# Patient Record
Sex: MALE | Race: AMERICAN INDIAN OR ALASKA NATIVE
[De-identification: names, ages, dates, MRNs, and addresses within clinical notes are randomized per-mention and may not be internally consistent; named-entity substitution may affect disease eponyms.]

---

## 2021-07-20 ENCOUNTER — HOSPITAL ENCOUNTER (EMERGENCY)
Dept: HOSPITAL 5 - ED | Age: 66
Discharge: HOME | End: 2021-07-20
Payer: MEDICARE

## 2021-07-20 VITALS — DIASTOLIC BLOOD PRESSURE: 59 MMHG | SYSTOLIC BLOOD PRESSURE: 117 MMHG

## 2021-07-20 DIAGNOSIS — Z87.891: ICD-10-CM

## 2021-07-20 DIAGNOSIS — I10: ICD-10-CM

## 2021-07-20 DIAGNOSIS — E11.9: ICD-10-CM

## 2021-07-20 DIAGNOSIS — F12.10: ICD-10-CM

## 2021-07-20 DIAGNOSIS — F03.90: ICD-10-CM

## 2021-07-20 DIAGNOSIS — Z79.899: ICD-10-CM

## 2021-07-20 DIAGNOSIS — R42: Primary | ICD-10-CM

## 2021-07-20 LAB
ALBUMIN SERPL-MCNC: 4.5 G/DL (ref 3.9–5)
ALT SERPL-CCNC: 24 UNITS/L (ref 7–56)
BASOPHILS # (AUTO): 0 K/MM3 (ref 0–0.1)
BASOPHILS NFR BLD AUTO: 0.9 % (ref 0–1.8)
BUN SERPL-MCNC: 6 MG/DL (ref 9–20)
BUN/CREAT SERPL: 8 %
CALCIUM SERPL-MCNC: 10.5 MG/DL (ref 8.4–10.2)
EOSINOPHIL # BLD AUTO: 0.1 K/MM3 (ref 0–0.4)
EOSINOPHIL NFR BLD AUTO: 2.2 % (ref 0–4.3)
HCT VFR BLD CALC: 41.9 % (ref 35.5–45.6)
HEMOLYSIS INDEX: 54
HGB BLD-MCNC: 14 GM/DL (ref 11.8–15.2)
LYMPHOCYTES # BLD AUTO: 1.3 K/MM3 (ref 1.2–5.4)
LYMPHOCYTES NFR BLD AUTO: 28.9 % (ref 13.4–35)
MCHC RBC AUTO-ENTMCNC: 34 % (ref 32–34)
MCV RBC AUTO: 88 FL (ref 84–94)
MONOCYTES # (AUTO): 0.6 K/MM3 (ref 0–0.8)
MONOCYTES % (AUTO): 13.4 % (ref 0–7.3)
PLATELET # BLD: 235 K/MM3 (ref 140–440)
RBC # BLD AUTO: 4.78 M/MM3 (ref 3.65–5.03)

## 2021-07-20 PROCEDURE — 84484 ASSAY OF TROPONIN QUANT: CPT

## 2021-07-20 PROCEDURE — 80053 COMPREHEN METABOLIC PANEL: CPT

## 2021-07-20 PROCEDURE — 85025 COMPLETE CBC W/AUTO DIFF WBC: CPT

## 2021-07-20 PROCEDURE — 82550 ASSAY OF CK (CPK): CPT

## 2021-07-20 PROCEDURE — 71045 X-RAY EXAM CHEST 1 VIEW: CPT

## 2021-07-20 PROCEDURE — 99285 EMERGENCY DEPT VISIT HI MDM: CPT

## 2021-07-20 PROCEDURE — 70450 CT HEAD/BRAIN W/O DYE: CPT

## 2021-07-20 PROCEDURE — 93005 ELECTROCARDIOGRAM TRACING: CPT

## 2021-07-20 PROCEDURE — 36415 COLL VENOUS BLD VENIPUNCTURE: CPT

## 2021-07-20 PROCEDURE — 83880 ASSAY OF NATRIURETIC PEPTIDE: CPT

## 2021-07-20 PROCEDURE — 71275 CT ANGIOGRAPHY CHEST: CPT

## 2021-07-20 PROCEDURE — 82962 GLUCOSE BLOOD TEST: CPT

## 2021-07-20 PROCEDURE — 82553 CREATINE MB FRACTION: CPT

## 2021-07-20 PROCEDURE — 85379 FIBRIN DEGRADATION QUANT: CPT

## 2021-07-20 NOTE — CAT SCAN REPORT
CT head/brain wo con



INDICATION:

Dizziness.



TECHNIQUE: Routine CT head. All CT scans at this location are performed using CT dose reduction for A
JOYCE by means of automated exposure control.



COMPARISON: 

None.



FINDINGS:



Intracranial: Gray-white matter differentiation is maintained. No intracranial hemorrhage. No extra a
xial collection. No hydrocephalus. No herniation.

Sinuses: Paranasal sinuses and mastoid air cells are essentially clear.

Orbits: Globes are intact. 

Calvarium: No acute fracture.





IMPRESSION:

1.  No acute intracranial abnormality.



Signer Name: Parish Tilley MD 

Signed: 7/20/2021 8:25 PM

Workstation Name: [id number]

## 2021-07-20 NOTE — XRAY REPORT
CHEST 1 VIEW 7/20/2021 11:20 AM



INDICATION / CLINICAL INFORMATION: Shortness of breath.



COMPARISON: None available.



FINDINGS:



SUPPORT DEVICES: None.



HEART / MEDIASTINUM: No significant abnormality. 



LUNGS / PLEURA: No significant pulmonary or pleural abnormality. No pneumothorax. 



ADDITIONAL FINDINGS: No significant additional findings.



IMPRESSION:

1. No acute findings.



Signer Name: Rodney Franks MD 

Signed: 7/20/2021 11:21 AM

Workstation Name: [email or web address]

## 2021-07-20 NOTE — EMERGENCY DEPARTMENT REPORT
HPI





- General


Time Seen by Provider: 21 10:50





- HPI


HPI: 





Room 25








The patient is a 66-year-old male present with a chief complaint of shortness of

breath.  The patient states prior to arrival he had fallen asleep in his rocking

chair and he awakened feeling short of breath gasping for air.  Patient denied 

chest pain or pain of any type.  Patient states he tried to stand up but felt 

dizzy prompting him to call 911.  Patient was transported to the ED and the 

patient currently denies shortness of breath.  Patient has a history of dementia

and is unable to tell me how long he was symptomatic.  I asked the patient if 

his symptoms resolved before he arrived to the emergency department and the 

patient replies "I do not know."





ED Past Medical Hx





- Past Medical History


Hx Hypertension: Yes


Hx Diabetes: Yes


Hx Dementia: Yes





- Surgical History


Additional Surgical History: Large ex lap wound present but patient is unaware 

of what surgery was performed and for what reason





- Family History


Family history: no significant





- Social History


Smoking Status: Former Smoker (None x15 years)


Substance Use Type: Marijuana





- Medications


Home Medications: 


                                Home Medications











 Medication  Instructions  Recorded  Confirmed  Last Taken  Type


 


Meclizine [Antivert] 25 mg PO TID PRN #20 tablet 21  Unknown Rx














ED Review of Systems


ROS: 


Stated complaint: SOB/DIZZINESS


Other details as noted in HPI





Constitutional: no symptoms reported


Eyes: denies: eye pain


ENT: denies: throat pain


Respiratory: shortness of breath


Cardiovascular: denies: chest pain


Endocrine: no symptoms reported


Gastrointestinal: denies: abdominal pain


Genitourinary: denies: dysuria


Musculoskeletal: denies: back pain


Neurological: denies: headache





Physical Exam





- Physical Exam


Physical Exam: 





GENERAL: The patient is well-developed well-nourished male lying on stretcher 

not appearing to be in acute distress. []


HEENT: Normocephalic.  Atraumatic.  Extraocular motions are intact.  Patient has

 moist mucous membranes.


NECK: Supple.  Trachea midline


CHEST/LUNGS: Clear to auscultation.  There is no respiratory distress noted.


HEART/CARDIOVASCULAR: Regular.  There is no tachycardia.  There is no gallop rub

 or murmur.


ABDOMEN: Abdomen is soft, nontender.  Patient has normal bowel sounds.  There is

 no abdominal distention.


SKIN: There is no rash.  There is no edema.  There is no diaphoresis.


NEURO: The patient is awake, alert, and oriented.  The patient is cooperative.  

The patient has no focal neurologic deficits.  The patient has normal speech.  

Cranial nerves II through XII grossly intact.  GCS 15


MUSCULOSKELETAL:  There is no evidence of acute injury.





ED Medical Decision Making





- Lab Data


Result diagrams: 


                                 21 11:11





                                 21 11:11





                                Laboratory Tests











  21





  11:03 11:11 11:11


 


WBC   4.5 


 


RBC   4.78 


 


Hgb   14.0 


 


Hct   41.9 


 


MCV   88 


 


MCH   29 


 


MCHC   34 


 


RDW   14.2 


 


Plt Count   235 


 


Lymph % (Auto)   28.9 


 


Mono % (Auto)   13.4 H 


 


Eos % (Auto)   2.2 


 


Baso % (Auto)   0.9 


 


Lymph # (Auto)   1.3 


 


Mono # (Auto)   0.6 


 


Eos # (Auto)   0.1 


 


Baso # (Auto)   0.0 


 


Seg Neutrophils %   54.6 


 


Seg Neutrophils #   2.4 


 


D-Dimer    247.74 H


 


Sodium   


 


Potassium   


 


Chloride   


 


Carbon Dioxide   


 


Anion Gap   


 


BUN   


 


Creatinine   


 


Estimated GFR   


 


BUN/Creatinine Ratio   


 


Glucose   


 


POC Glucose  138 H  


 


Calcium   


 


Total Bilirubin   


 


AST   


 


ALT   


 


Alkaline Phosphatase   


 


Total Creatine Kinase   


 


CK-MB (CK-2)   


 


CK-MB (CK-2) Rel Index   


 


Troponin T   


 


NT-Pro-B Natriuret Pep   


 


Total Protein   


 


Albumin   


 


Albumin/Globulin Ratio   














  21





  11:11 15:14 15:27


 


WBC   


 


RBC   


 


Hgb   


 


Hct   


 


MCV   


 


MCH   


 


MCHC   


 


RDW   


 


Plt Count   


 


Lymph % (Auto)   


 


Mono % (Auto)   


 


Eos % (Auto)   


 


Baso % (Auto)   


 


Lymph # (Auto)   


 


Mono # (Auto)   


 


Eos # (Auto)   


 


Baso # (Auto)   


 


Seg Neutrophils %   


 


Seg Neutrophils #   


 


D-Dimer   


 


Sodium  137  


 


Potassium  4.3  


 


Chloride  99.4  


 


Carbon Dioxide  26  


 


Anion Gap  16  


 


BUN  6 L  


 


Creatinine  0.8  


 


Estimated GFR  > 60  


 


BUN/Creatinine Ratio  8  


 


Glucose  129 H  


 


POC Glucose   86 


 


Calcium  10.5 H  


 


Total Bilirubin  0.30  


 


AST  19  


 


ALT  24  


 


Alkaline Phosphatase  84  


 


Total Creatine Kinase  117  


 


CK-MB (CK-2)  < 1.0  


 


CK-MB (CK-2) Rel Index  0.8  


 


Troponin T  < 0.010   < 0.010


 


NT-Pro-B Natriuret Pep  16.00  


 


Total Protein  7.6  


 


Albumin  4.5  


 


Albumin/Globulin Ratio  1.5  














- EKG Data


-: EKG Interpreted by Me


EKG shows normal: sinus rhythm


Rate: normal





- EKG Data


When compared to previous EKG there are: previous EKG unavailable


Interpretation: other (No ischemic changes seen)





- Radiology Data


Radiology results: report reviewed (CT chest), image reviewed (Chest x-ray, CT 

chest)


interpreted by me: 





Chest x-ray-no definite focal infiltrates, no pneumothorax.  No foreign body 

seen





[facility] [address] [id number] Cat

 Scan Report Signed Patient: LANCE DERAS MR#: [id number] : 1955 

Acct:[id number] Age/Sex: 66 / M ADM Date: 21 Loc: ED Attending Dr: 

Ordering Physician: KEARA LOYD MD Date of Service: 21 Procedure(s): CT 

angio chest Accession Number(s): [id number] cc: KEARA LOYD MD CTA CHEST WITH 

CONTRAST INDICATION : Shortness of breath OMNI 350 100ML . TECHNIQUE: Axial 

imaging performed through the chest, with contrast bolus timing set to maximize 

opacification of the pulmonary arteries. Sagittal and coronal reformatted 

images. 3-plane MIP reformatted images were obtained. All CT scans at this 

location are performed using CT dose reduction for ALARA by means of automated 

exposure control. 100 mL of intravenous contrast administered. COMPARISON: None 

FINDINGS: Bolus: Contrast bolus timing is adequate. PTE: No filling defect is 

present to suggest PTE. Mediastinum: Heart and great vessels appear normal. No 

pathologic mediastinal adenopathy. Lungs: The lungs are clear with no evidence 

for infiltrate, pleural fluid or pneumothorax. Minimal emphysematous changes are

 noted in the upper lobes. Bones: Degenerative changes in the spine with nothing

 acute. Upper abdomen: Limited imaging of the upper abdomen shows nothing acute.

 IMPRESSION: Negative for PTE. Clear lungs. Minimal emphysematous changes. 

Signer Name: Axel Romero Jr, MD Signed: 2021 2:55 PM Workstation Name:

 [id number] Transcribed By: TTR Dictated By: AXEL ROMERO JR, MD 

Electronically Authenticated By: AXEL ROMERO JR, MD Signed Date/Time: 

21 0375 DD/DT: 21 1446 TD/TT: 


Print Cancel 











- Differential Diagnosis


Shortness of breath, ACS, bronchitis, PE, dysrhythmia


Critical care attestation.: 


If time is entered above; I have spent that time in minutes in the direct care 

of this critically ill patient, excluding procedure time.








ED Disposition


Clinical Impression: 


 Vertigo, Dementia





Disposition: - TO HOME OR SELFCARE


Is pt being admited?: No


Does the pt Need Aspirin: No


Condition: Stable


Instructions:  Dizziness, Easy-to-Read


Additional Instructions: 


Return to the emergency department should you develop worsening symptoms, 

inability to tolerate food or liquids, high fever or any other concerns


Prescriptions: 


Meclizine [Antivert] 25 mg PO TID PRN #20 tablet


 PRN Reason: Vertigo


Referrals: 


OSMANI GOMES MD [Primary Care Provider] - 3-5 Days

## 2021-07-20 NOTE — CAT SCAN REPORT
CTA CHEST WITH CONTRAST



INDICATION : Shortness of breath OMNI 350 100ML .



TECHNIQUE:  Axial imaging performed through the chest, with contrast bolus timing set to maximize opa
cification of the pulmonary arteries. Sagittal and coronal reformatted images. 3-plane MIP reformatte
d images were obtained.  All CT scans at this location are performed using CT dose reduction for ALAR
A by means of automated exposure control. 



100 mL of intravenous contrast administered.



COMPARISON:  None



FINDINGS:  



Bolus:  Contrast bolus timing is adequate.

PTE:  No filling defect is present to suggest PTE.

Mediastinum:  Heart and great vessels appear normal.  No pathologic mediastinal adenopathy.

Lungs:  The lungs are clear with no evidence for infiltrate, pleural fluid or pneumothorax. Minimal e
mphysematous changes are noted in the upper lobes.

Bones:  Degenerative changes in the spine with nothing acute.



Upper abdomen:  Limited imaging of the upper abdomen shows nothing acute.





IMPRESSION:

 Negative for PTE.  Clear lungs. Minimal emphysematous changes.



Signer Name: Axel Romero Jr, MD 

Signed: 7/20/2021 2:55 PM

Workstation Name: [id number]

## 2021-07-22 NOTE — ELECTROCARDIOGRAPH REPORT
[facility]

                                       

Test Date:    2021               Test Time:    11:26:07

Pat Name:     LANCE DERAS            Department:   

Patient ID:   [id number]          Room:          

Gender:       M                        Technician:   NURSE

:          1955               Requested By: KEARA LOYD

Order Number: [id number]              Reading MD:   Armand Mackey

                                 Measurements

Intervals                              Axis          

Rate:         79                       P:            32

NC:           159                      QRS:          -26

QRSD:         89                       T:            18

QT:           383                                    

QTc:          438                                    

                           Interpretive Statements

Sinus rhythm

Ventricular premature complex

Probable left atrial enlargement

Left axis deviation

Poor R wave progression, possible old anteroseptal infarct

No previous ECG available for comparison

Electronically Signed On 2021 12:12:10 EDT by Armand Mackey

## 2021-10-30 ENCOUNTER — HOSPITAL ENCOUNTER (EMERGENCY)
Dept: HOSPITAL 5 - ED | Age: 66
Discharge: HOME | End: 2021-10-30
Payer: MEDICARE

## 2021-10-30 VITALS — SYSTOLIC BLOOD PRESSURE: 149 MMHG | DIASTOLIC BLOOD PRESSURE: 82 MMHG

## 2021-10-30 DIAGNOSIS — Z79.899: ICD-10-CM

## 2021-10-30 DIAGNOSIS — F12.90: ICD-10-CM

## 2021-10-30 DIAGNOSIS — I10: ICD-10-CM

## 2021-10-30 DIAGNOSIS — Z87.891: ICD-10-CM

## 2021-10-30 DIAGNOSIS — Z79.84: ICD-10-CM

## 2021-10-30 DIAGNOSIS — E11.649: ICD-10-CM

## 2021-10-30 DIAGNOSIS — R79.1: ICD-10-CM

## 2021-10-30 DIAGNOSIS — F03.90: Primary | ICD-10-CM

## 2021-10-30 LAB
ALBUMIN SERPL-MCNC: 4.3 G/DL (ref 3.9–5)
ALT SERPL-CCNC: 24 UNITS/L (ref 7–56)
BASOPHILS # (AUTO): 0.1 K/MM3 (ref 0–0.1)
BASOPHILS NFR BLD AUTO: 2.2 % (ref 0–1.8)
BILIRUB UR QL STRIP: (no result)
BLOOD UR QL VISUAL: (no result)
BUN SERPL-MCNC: 8 MG/DL (ref 9–20)
BUN/CREAT SERPL: 10 %
CALCIUM SERPL-MCNC: 10 MG/DL (ref 8.4–10.2)
EOSINOPHIL # BLD AUTO: 0.1 K/MM3 (ref 0–0.4)
EOSINOPHIL NFR BLD AUTO: 3 % (ref 0–4.3)
HCT VFR BLD CALC: 43 % (ref 35.5–45.6)
HEMOLYSIS INDEX: 9
HGB BLD-MCNC: 14.3 GM/DL (ref 11.8–15.2)
INR PPP: 0.96 (ref 0.87–1.13)
LYMPHOCYTES # BLD AUTO: 1.3 K/MM3 (ref 1.2–5.4)
LYMPHOCYTES NFR BLD AUTO: 28.7 % (ref 13.4–35)
MCHC RBC AUTO-ENTMCNC: 33 % (ref 32–34)
MCV RBC AUTO: 87 FL (ref 84–94)
MONOCYTES # (AUTO): 0.5 K/MM3 (ref 0–0.8)
MONOCYTES % (AUTO): 11 % (ref 0–7.3)
PH UR STRIP: 8 [PH] (ref 5–7)
PLATELET # BLD: 285 K/MM3 (ref 140–440)
PROT UR STRIP-MCNC: (no result) MG/DL
RBC # BLD AUTO: 4.94 M/MM3 (ref 3.65–5.03)
RBC #/AREA URNS HPF: 1 /HPF (ref 0–6)
UROBILINOGEN UR-MCNC: < 2 MG/DL (ref ?–2)
WBC #/AREA URNS HPF: < 1 /HPF (ref 0–6)

## 2021-10-30 PROCEDURE — 80053 COMPREHEN METABOLIC PANEL: CPT

## 2021-10-30 PROCEDURE — 93005 ELECTROCARDIOGRAM TRACING: CPT

## 2021-10-30 PROCEDURE — G0480 DRUG TEST DEF 1-7 CLASSES: HCPCS

## 2021-10-30 PROCEDURE — 99285 EMERGENCY DEPT VISIT HI MDM: CPT

## 2021-10-30 PROCEDURE — 83735 ASSAY OF MAGNESIUM: CPT

## 2021-10-30 PROCEDURE — 82550 ASSAY OF CK (CPK): CPT

## 2021-10-30 PROCEDURE — 82140 ASSAY OF AMMONIA: CPT

## 2021-10-30 PROCEDURE — 82962 GLUCOSE BLOOD TEST: CPT

## 2021-10-30 PROCEDURE — 85610 PROTHROMBIN TIME: CPT

## 2021-10-30 PROCEDURE — 70450 CT HEAD/BRAIN W/O DYE: CPT

## 2021-10-30 PROCEDURE — 85025 COMPLETE CBC W/AUTO DIFF WBC: CPT

## 2021-10-30 PROCEDURE — 81001 URINALYSIS AUTO W/SCOPE: CPT

## 2021-10-30 PROCEDURE — 71045 X-RAY EXAM CHEST 1 VIEW: CPT

## 2021-10-30 PROCEDURE — 84484 ASSAY OF TROPONIN QUANT: CPT

## 2021-10-30 PROCEDURE — 80320 DRUG SCREEN QUANTALCOHOLS: CPT

## 2021-10-30 PROCEDURE — 36415 COLL VENOUS BLD VENIPUNCTURE: CPT

## 2021-10-30 PROCEDURE — 84443 ASSAY THYROID STIM HORMONE: CPT

## 2021-10-30 NOTE — XRAY REPORT
CHEST 1 VIEW 10/30/2021 10:50 AM



INDICATION / CLINICAL INFORMATION: Weakness.



COMPARISON: 7/20/2021



FINDINGS:



SUPPORT DEVICES: None.

HEART / MEDIASTINUM: No significant abnormality. 

LUNGS / PLEURA: No significant pulmonary or pleural abnormality. No pneumothorax. 



ADDITIONAL FINDINGS: No significant additional findings.



IMPRESSION:

1. No acute findings.



Signer Name: Ji Koenig MD 

Signed: 10/30/2021 11:16 AM

Workstation Name: [id number]

## 2021-10-30 NOTE — EMERGENCY DEPARTMENT REPORT
ED General Adult HPI





- General


Chief complaint: Weakness


Stated complaint: SLUTTERING/WEAKNESS


PUI?: No


Time Seen by Provider: 10/30/21 10:40


Source: patient, family, EMS (Verbal report received from emergency medical ser

vices.  EMS documentation not available at time of chart dictation ), RN notes 

reviewed, old records reviewed


Mode of arrival: Stretcher


Limitations: Other (Patient is demented and a poor historian)





- History of Present Illness


Initial comments: 





The patient is a 66-year-old gentleman.  The patient has a history of dementia 

and diabetes.  He follows with the [facility] medical group.  Most of the history 

is obtained from EMS, and by speaking to his wife, Ms. Trudi Monzon.





Apparently, the patient was confused this morning, and stuttering.  He was also 

found to have evidence of hypoglycemia.





As per his wife, he is currently seeing an outpatient neurologist, it was 

recently started on Seroquel/quetiapine for stuttering, dementia, and 

depression.





Wife denies headache, chest pain, shortness of breath, nausea, vomiting, 

diarrhea, fevers and chills and urinary symptoms.





In the emergency room, his wife corroborates that the patient appears to be at 

his demented baseline.





The patient himself is pleasantly confused.  He denies physical pain.  The 

patient is not able to describe qualitative nature of symptoms, exacerbating 

factors, relieving factors or aggravating factors.  EMS reported Accu-Chek was 

low in the field.  In the emergency room, Accu-Chek is within acceptable limits.


-: This morning


Consistency: now resolved


Improves with: medication


Associated Symptoms: denies other symptoms





- Related Data


                                Home Medications











 Medication  Instructions  Recorded  Confirmed  Last Taken


 


AtorvaSTATin 40 mg PO HS 10/30/21 10/30/21 Unknown


 


Lisinopril 20 mg PO DAILY 10/30/21 10/30/21 Unknown


 


QUEtiapine 25 mg PO HS 10/30/21 10/30/21 Unknown


 


amLODIPine 10 mg PO DAILY 10/30/21 10/30/21 Unknown


 


metFORMIN 500 mg PO BID 10/30/21 10/30/21 Unknown











                                    Allergies











Allergy/AdvReac Type Severity Reaction Status Date / Time


 


No Known Allergies Allergy   Unverified 07/20/21 12:44














ED Review of Systems


ROS: 


Stated complaint: SLUTTERING/WEAKNESS


Other details as noted in HPI





Comment: Per wife


Constitutional: denies: fever


Eyes: denies: eye discharge


ENT: denies: epistaxis


Respiratory: denies: cough


Cardiovascular: denies: chest pain


Gastrointestinal: denies: abdominal pain


Genitourinary: denies: dysuria, frequency


Neurological: confusion (Baseline)





ED Past Medical Hx





- Past Medical History


Previous Medical History?: Yes


Hx Hypertension: Yes


Hx Diabetes: Yes


Hx Dementia: Yes


Additional medical history: dementia, vertigo





- Surgical History


Past Surgical History?: Yes


Additional Surgical History: Large ex lap wound present but patient is unaware 

of what surgery was performed and for what reason





- Social History


Smoking Status: Former Smoker (None x15 years)


Substance Use Type: Marijuana





- Medications


Home Medications: 


                                Home Medications











 Medication  Instructions  Recorded  Confirmed  Last Taken  Type


 


AtorvaSTATin 40 mg PO HS 10/30/21 10/30/21 Unknown History


 


Lisinopril 20 mg PO DAILY 10/30/21 10/30/21 Unknown History


 


QUEtiapine 25 mg PO HS 10/30/21 10/30/21 Unknown History


 


amLODIPine 10 mg PO DAILY 10/30/21 10/30/21 Unknown History


 


metFORMIN 500 mg PO BID 10/30/21 10/30/21 Unknown History














ED Physical Exam





- General


Limitations: Other (Demented and confused)


General appearance: alert, in no apparent distress





- Head


Head exam: Present: atraumatic, normocephalic





- Eye


Eye exam: Present: normal appearance, EOMI.  Absent: nystagmus





- ENT


ENT exam: Present: normal exam, normal orophraynx, mucous membranes moist, 

normal external ear exam





- Neck


Neck exam: Present: normal inspection, full ROM.  Absent: tenderness, 

meningismus





- Respiratory


Respiratory exam: Present: normal lung sounds bilaterally.  Absent: respiratory 

distress, wheezes, rales, rhonchi, stridor, decreased breath sounds





- Cardiovascular


Cardiovascular Exam: Present: regular rate, normal rhythm, normal heart sounds. 

Absent: bradycardia, tachycardia, irregular rhythm, systolic murmur, diastolic 

murmur, rubs, gallop





- GI/Abdominal


GI/Abdominal exam: Present: soft.  Absent: distended, tenderness, guarding, 

rebound, rigid, pulsatile mass





- Rectal


Rectal exam: Present: deferred





- 


 exam: Present: normal inspection


External exam: Present: normal external exam, other (Chaperoned by nurse Franky Rodriguez)





- Extremities Exam


Extremities exam: Present: normal inspection, full ROM, other (2+ pulses noted 

in the bilateral upper and lower extremities.  There is no palpable cord.   

negative Homans sign.  Muscular compartments are soft.  The pelvis is stable.). 

Absent: pedal edema, calf tenderness





- Back Exam


Back exam: Present: normal inspection, full ROM.  Absent: tenderness, CVA 

tenderness (R), CVA tenderness (L), muscle spasm, vertebral tenderness, rash 

noted





- Neurological Exam


Neurological exam: Present: altered (Patient is alert to name.  Does not know 

the month.  Follows commands.  Knows that he is at a hospital.), normal gait, 

other (2+ pulses noted in the bilateral upper and lower extremities.  There is 

no palpable cord.   negative Homans sign.  Muscular compartments are soft.  The 

pelvis is stable.).  Absent: motor sensory deficit





- Psychiatric


Psychiatric exam: Present: anxious





- Skin


Skin exam: Present: warm, dry, intact, normal color.  Absent: rash





ED Course


                                   Vital Signs











  10/30/21 10/30/21





  10:59 11:28


 


Temperature 98.8 F 98.9 F


 


Pulse Rate 86 


 


Respiratory 16 





Rate  


 


Blood Pressure 149/82 





[Left]  


 


O2 Sat by Pulse 98 





Oximetry  














ED Medical Decision Making





- Lab Data


Result diagrams: 


                                 10/30/21 11:32





                                 10/30/21 11:32








                                   Vital Signs











  10/30/21 10/30/21





  10:59 11:28


 


Temperature 98.8 F 98.9 F


 


Pulse Rate 86 


 


Respiratory 16 





Rate  


 


Blood Pressure 149/82 





[Left]  


 


O2 Sat by Pulse 98 





Oximetry  











                                   Lab Results











  10/30/21 10/30/21 10/30/21 Range/Units





  11:22 11:26 11:32 


 


WBC     (4.5-11.0)  K/mm3


 


RBC     (3.65-5.03)  M/mm3


 


Hgb     (11.8-15.2)  gm/dl


 


Hct     (35.5-45.6)  %


 


MCV     (84-94)  fl


 


MCH     (28-32)  pg


 


MCHC     (32-34)  %


 


RDW     (13.2-15.2)  %


 


Plt Count     (140-440)  K/mm3


 


Lymph % (Auto)     (13.4-35.0)  %


 


Mono % (Auto)     (0.0-7.3)  %


 


Eos % (Auto)     (0.0-4.3)  %


 


Baso % (Auto)     (0.0-1.8)  %


 


Lymph # (Auto)     (1.2-5.4)  K/mm3


 


Mono # (Auto)     (0.0-0.8)  K/mm3


 


Eos # (Auto)     (0.0-0.4)  K/mm3


 


Baso # (Auto)     (0.0-0.1)  K/mm3


 


Seg Neutrophils %     (40.0-70.0)  %


 


Seg Neutrophils #     (1.8-7.7)  K/mm3


 


PT     (12.2-14.9)  Sec.


 


INR     (0.87-1.13)  


 


Sodium    136 L  (137-145)  mmol/L


 


Potassium    4.2  (3.6-5.0)  mmol/L


 


Chloride    100.4  ()  mmol/L


 


Carbon Dioxide    24  (22-30)  mmol/L


 


Anion Gap    16  mmol/L


 


BUN    8 L  (9-20)  mg/dL


 


Creatinine    0.8  (0.8-1.3)  mg/dL


 


Estimated GFR    > 60  ml/min


 


BUN/Creatinine Ratio    10  %


 


Glucose    170 H  ()  mg/dL


 


POC Glucose   157 H   ()  mg/dL


 


Lactic Acid     (0.7-2.0)  mmol/L


 


Calcium    10.0  (8.4-10.2)  mg/dL


 


Magnesium     (1.7-2.3)  mg/dL


 


Total Bilirubin    0.30  (0.1-1.2)  mg/dL


 


AST    13  (5-40)  units/L


 


ALT    24  (7-56)  units/L


 


Alkaline Phosphatase    92  ()  units/L


 


Total Creatine Kinase     ()  units/L


 


Troponin T     (0.00-0.029)  ng/mL


 


Total Protein    7.9  (6.3-8.2)  g/dL


 


Albumin    4.3  (3.9-5)  g/dL


 


Albumin/Globulin Ratio    1.2  %


 


TSH     (0.270-4.200)  mlU/mL


 


Urine Color  Straw    (Yellow)  


 


Urine Turbidity  Clear    (Clear)  


 


Urine pH  8.0 H    (5.0-7.0)  


 


Ur Specific Gravity  1.006    (1.003-1.030)  


 


Urine Protein  <15 mg/dl    (Negative)  mg/dL


 


Urine Glucose (UA)  Neg    (Negative)  mg/dL


 


Urine Ketones  Neg    (Negative)  mg/dL


 


Urine Blood  Neg    (Negative)  


 


Urine Nitrite  Neg    (Negative)  


 


Urine Bilirubin  Neg    (Negative)  


 


Urine Urobilinogen  < 2.0    (<2.0)  mg/dL


 


Ur Leukocyte Esterase  Neg    (Negative)  


 


Urine WBC (Auto)  < 1.0    (0.0-6.0)  /HPF


 


Urine RBC (Auto)  1.0    (0.0-6.0)  /HPF


 


Salicylates     (2.8-20.0)  mg/dL


 


Acetaminophen     (10.0-30.0)  ug/mL














  10/30/21 10/30/21 10/30/21 Range/Units





  11:32 11:32 11:32 


 


WBC  4.4 L    (4.5-11.0)  K/mm3


 


RBC  4.94    (3.65-5.03)  M/mm3


 


Hgb  14.3    (11.8-15.2)  gm/dl


 


Hct  43.0    (35.5-45.6)  %


 


MCV  87    (84-94)  fl


 


MCH  29    (28-32)  pg


 


MCHC  33    (32-34)  %


 


RDW  13.6    (13.2-15.2)  %


 


Plt Count  285    (140-440)  K/mm3


 


Lymph % (Auto)  28.7    (13.4-35.0)  %


 


Mono % (Auto)  11.0 H    (0.0-7.3)  %


 


Eos % (Auto)  3.0    (0.0-4.3)  %


 


Baso % (Auto)  2.2 H    (0.0-1.8)  %


 


Lymph # (Auto)  1.3    (1.2-5.4)  K/mm3


 


Mono # (Auto)  0.5    (0.0-0.8)  K/mm3


 


Eos # (Auto)  0.1    (0.0-0.4)  K/mm3


 


Baso # (Auto)  0.1    (0.0-0.1)  K/mm3


 


Seg Neutrophils %  55.1    (40.0-70.0)  %


 


Seg Neutrophils #  2.4    (1.8-7.7)  K/mm3


 


PT   13.8   (12.2-14.9)  Sec.


 


INR   0.96   (0.87-1.13)  


 


Sodium     (137-145)  mmol/L


 


Potassium     (3.6-5.0)  mmol/L


 


Chloride     ()  mmol/L


 


Carbon Dioxide     (22-30)  mmol/L


 


Anion Gap     mmol/L


 


BUN     (9-20)  mg/dL


 


Creatinine     (0.8-1.3)  mg/dL


 


Estimated GFR     ml/min


 


BUN/Creatinine Ratio     %


 


Glucose     ()  mg/dL


 


POC Glucose     ()  mg/dL


 


Lactic Acid     (0.7-2.0)  mmol/L


 


Calcium     (8.4-10.2)  mg/dL


 


Magnesium    1.90  (1.7-2.3)  mg/dL


 


Total Bilirubin     (0.1-1.2)  mg/dL


 


AST     (5-40)  units/L


 


ALT     (7-56)  units/L


 


Alkaline Phosphatase     ()  units/L


 


Total Creatine Kinase    103  ()  units/L


 


Troponin T    < 0.010  (0.00-0.029)  ng/mL


 


Total Protein     (6.3-8.2)  g/dL


 


Albumin     (3.9-5)  g/dL


 


Albumin/Globulin Ratio     %


 


TSH     (0.270-4.200)  mlU/mL


 


Urine Color     (Yellow)  


 


Urine Turbidity     (Clear)  


 


Urine pH     (5.0-7.0)  


 


Ur Specific Gravity     (1.003-1.030)  


 


Urine Protein     (Negative)  mg/dL


 


Urine Glucose (UA)     (Negative)  mg/dL


 


Urine Ketones     (Negative)  mg/dL


 


Urine Blood     (Negative)  


 


Urine Nitrite     (Negative)  


 


Urine Bilirubin     (Negative)  


 


Urine Urobilinogen     (<2.0)  mg/dL


 


Ur Leukocyte Esterase     (Negative)  


 


Urine WBC (Auto)     (0.0-6.0)  /HPF


 


Urine RBC (Auto)     (0.0-6.0)  /HPF


 


Salicylates     (2.8-20.0)  mg/dL


 


Acetaminophen     (10.0-30.0)  ug/mL














  10/30/21 10/30/21 10/30/21 Range/Units





  11:32 11:32 11:32 


 


WBC     (4.5-11.0)  K/mm3


 


RBC     (3.65-5.03)  M/mm3


 


Hgb     (11.8-15.2)  gm/dl


 


Hct     (35.5-45.6)  %


 


MCV     (84-94)  fl


 


MCH     (28-32)  pg


 


MCHC     (32-34)  %


 


RDW     (13.2-15.2)  %


 


Plt Count     (140-440)  K/mm3


 


Lymph % (Auto)     (13.4-35.0)  %


 


Mono % (Auto)     (0.0-7.3)  %


 


Eos % (Auto)     (0.0-4.3)  %


 


Baso % (Auto)     (0.0-1.8)  %


 


Lymph # (Auto)     (1.2-5.4)  K/mm3


 


Mono # (Auto)     (0.0-0.8)  K/mm3


 


Eos # (Auto)     (0.0-0.4)  K/mm3


 


Baso # (Auto)     (0.0-0.1)  K/mm3


 


Seg Neutrophils %     (40.0-70.0)  %


 


Seg Neutrophils #     (1.8-7.7)  K/mm3


 


PT     (12.2-14.9)  Sec.


 


INR     (0.87-1.13)  


 


Sodium     (137-145)  mmol/L


 


Potassium     (3.6-5.0)  mmol/L


 


Chloride     ()  mmol/L


 


Carbon Dioxide     (22-30)  mmol/L


 


Anion Gap     mmol/L


 


BUN     (9-20)  mg/dL


 


Creatinine     (0.8-1.3)  mg/dL


 


Estimated GFR     ml/min


 


BUN/Creatinine Ratio     %


 


Glucose     ()  mg/dL


 


POC Glucose     ()  mg/dL


 


Lactic Acid     (0.7-2.0)  mmol/L


 


Calcium     (8.4-10.2)  mg/dL


 


Magnesium     (1.7-2.3)  mg/dL


 


Total Bilirubin     (0.1-1.2)  mg/dL


 


AST     (5-40)  units/L


 


ALT     (7-56)  units/L


 


Alkaline Phosphatase     ()  units/L


 


Total Creatine Kinase     ()  units/L


 


Troponin T     (0.00-0.029)  ng/mL


 


Total Protein     (6.3-8.2)  g/dL


 


Albumin     (3.9-5)  g/dL


 


Albumin/Globulin Ratio     %


 


TSH  1.030    (0.270-4.200)  mlU/mL


 


Urine Color     (Yellow)  


 


Urine Turbidity     (Clear)  


 


Urine pH     (5.0-7.0)  


 


Ur Specific Gravity     (1.003-1.030)  


 


Urine Protein     (Negative)  mg/dL


 


Urine Glucose (UA)     (Negative)  mg/dL


 


Urine Ketones     (Negative)  mg/dL


 


Urine Blood     (Negative)  


 


Urine Nitrite     (Negative)  


 


Urine Bilirubin     (Negative)  


 


Urine Urobilinogen     (<2.0)  mg/dL


 


Ur Leukocyte Esterase     (Negative)  


 


Urine WBC (Auto)     (0.0-6.0)  /HPF


 


Urine RBC (Auto)     (0.0-6.0)  /HPF


 


Salicylates   < 0.3 L   (2.8-20.0)  mg/dL


 


Acetaminophen    5.0 L  (10.0-30.0)  ug/mL














  10/30/21 Range/Units





  11:32 


 


WBC   (4.5-11.0)  K/mm3


 


RBC   (3.65-5.03)  M/mm3


 


Hgb   (11.8-15.2)  gm/dl


 


Hct   (35.5-45.6)  %


 


MCV   (84-94)  fl


 


MCH   (28-32)  pg


 


MCHC   (32-34)  %


 


RDW   (13.2-15.2)  %


 


Plt Count   (140-440)  K/mm3


 


Lymph % (Auto)   (13.4-35.0)  %


 


Mono % (Auto)   (0.0-7.3)  %


 


Eos % (Auto)   (0.0-4.3)  %


 


Baso % (Auto)   (0.0-1.8)  %


 


Lymph # (Auto)   (1.2-5.4)  K/mm3


 


Mono # (Auto)   (0.0-0.8)  K/mm3


 


Eos # (Auto)   (0.0-0.4)  K/mm3


 


Baso # (Auto)   (0.0-0.1)  K/mm3


 


Seg Neutrophils %   (40.0-70.0)  %


 


Seg Neutrophils #   (1.8-7.7)  K/mm3


 


PT   (12.2-14.9)  Sec.


 


INR   (0.87-1.13)  


 


Sodium   (137-145)  mmol/L


 


Potassium   (3.6-5.0)  mmol/L


 


Chloride   ()  mmol/L


 


Carbon Dioxide   (22-30)  mmol/L


 


Anion Gap   mmol/L


 


BUN   (9-20)  mg/dL


 


Creatinine   (0.8-1.3)  mg/dL


 


Estimated GFR   ml/min


 


BUN/Creatinine Ratio   %


 


Glucose   ()  mg/dL


 


POC Glucose   ()  mg/dL


 


Lactic Acid  0.90  (0.7-2.0)  mmol/L


 


Calcium   (8.4-10.2)  mg/dL


 


Magnesium   (1.7-2.3)  mg/dL


 


Total Bilirubin   (0.1-1.2)  mg/dL


 


AST   (5-40)  units/L


 


ALT   (7-56)  units/L


 


Alkaline Phosphatase   ()  units/L


 


Total Creatine Kinase   ()  units/L


 


Troponin T   (0.00-0.029)  ng/mL


 


Total Protein   (6.3-8.2)  g/dL


 


Albumin   (3.9-5)  g/dL


 


Albumin/Globulin Ratio   %


 


TSH   (0.270-4.200)  mlU/mL


 


Urine Color   (Yellow)  


 


Urine Turbidity   (Clear)  


 


Urine pH   (5.0-7.0)  


 


Ur Specific Gravity   (1.003-1.030)  


 


Urine Protein   (Negative)  mg/dL


 


Urine Glucose (UA)   (Negative)  mg/dL


 


Urine Ketones   (Negative)  mg/dL


 


Urine Blood   (Negative)  


 


Urine Nitrite   (Negative)  


 


Urine Bilirubin   (Negative)  


 


Urine Urobilinogen   (<2.0)  mg/dL


 


Ur Leukocyte Esterase   (Negative)  


 


Urine WBC (Auto)   (0.0-6.0)  /HPF


 


Urine RBC (Auto)   (0.0-6.0)  /HPF


 


Salicylates   (2.8-20.0)  mg/dL


 


Acetaminophen   (10.0-30.0)  ug/mL














- EKG Data


-: EKG Interpreted by Me


EKG shows normal: sinus rhythm


Rate: normal





- EKG Data





10/30/21 12:53


The EKG is interpreted at 11: 08





Sinus rhythm, 72 bpm.  Left axis deviation, normal P wave axis.  Borderline left

 anterior fascicular block, motion artifact, and poor R wave progression.  Low 

voltage in the lateral leads.  This is an abnormal EKG.  This is not a STEMI.  

This appears unchanged when compared to prior EKG from 07/20/2021





- Radiology Data


Radiology results: pending, report reviewed, image reviewed





CT HEAD WITHOUT CONTRAST  INDICATION / CLINICAL INFORMATION: Weakness.  

TECHNIQUE: All CT scans at this location are performed using CT dose reduction 

for ALARA by means of automated exposure control.  COMPARISON: 7/20/2021  

FINDINGS: HEMORRHAGE: None. EXTRA-AXIAL SPACES: Moderately prominent likely 

related to cortical atrophy. VENTRICULAR SYSTEM: Normal in size and morphology 

for the patient's age. CEREBRAL PARENCHYMA: No significant abnormality. No acute

 territorial infarct. MIDLINE SHIFT / HERNIATION: None. CEREBELLUM / BRAINSTEM: 

No significant abnormality.  ORBITS: Normal as visualized. SOFT TISSUES: No 

significant abnormality. SKULL: No significant abnormality. PARANASAL SINUSES / 

MASTOID AIR CELLS: Normal as visualized.  ADDITIONAL FINDINGS: None.  

IMPRESSION: 1. There is cortical atrophy. No acute intracranial abnormality.  

Signer Name: Ji Koenig MD Signed: 10/30/2021 11:23 AM Workstation Name: 

[id number]





CHEST 1 VIEW 10/30/2021 10:50 AM  INDICATION / CLINICAL INFORMATION: Weakness.  

COMPARISON: 7/20/2021  FINDINGS:  SUPPORT DEVICES: None. HEART / MEDIASTINUM: No

 significant abnormality. LUNGS / PLEURA: No significant pulmonary or pleural 

abnormality. No pneumothorax.  ADDITIONAL FINDINGS: No significant additional 

findings.  IMPRESSION: 1. No acute findings.  Signer Name: Ji Koenig MD 

Signed: 10/30/2021 10:16 AM Workstation Name: [id number]





- Medical Decision Making





Differential diagnosis, including but not limited to: Hypoglycemia, now 

resolved, thyroid derangement, pneumonia, urinary tract infection, electrolyte 

derangement, dementia





Assessment and plan


66-year-old gentleman, with a history of diabetes, hypertension, high cholest

jonas and dementia.  He does not take glipizide, glyburide, or sulfonylureas.





Medications include NovoLog insulin, Metformin, and insulin pump.





His wife, Ms. Hilary Monzon; [phone] is currently at the bedside.





The patient's primary care doctor is Dr. Mulligan with the [facility] group.





The patient recently started seeing a neurologist for speech stuttering, as well

 as dementia and depression.  He was recently started on Seroquel/quetiapine.





His wife states that there is no fever, nausea, vomiting or diarrhea, cough, or 

ataxia.  She states that the the patient appears to be at his mental status 

baseline at this time.





She states that she is able to monitor this patient's glucose at home 

comfortably.  She is aware of the signs of hypoglycemia.





I discontinued the patient's insulin pump here in the emergency room, placed it 

in a specimen bag, and handed it to the patient's wife.  He may continue 

Metformin, insulin as needed, and an appropriate diabetic diet.





Laboratory studies were unremarkable for emergent findings.





Noncontrast CT scan of the brain, and x-ray the chest were unremarkable for 

acute findings.





Patient is observed in the ER for hours without clinical deterioration, or 

recurrent hypoglycemia.  On final reassessment, he is resting comfortably on her

 stretcher, in no acute distress, with his wife at the bedside, who corroborates

 that he is at baseline.





He can follow-up with his outpatient neurologist, Dr. Vora for his history 

of stuttering speech and dementia.  He may follow-up with his primary care 

doctor for his chronic medical issues.  Return precautions are discussed with 

the patient's wife.  She articulated understanding.  She feels comfortable to 

take him home








Critical care attestation.: 


If time is entered above; I have spent that time in minutes in the direct care 

of this critically ill patient, excluding procedure time.








ED Disposition


Clinical Impression: 


 Dementia, Hypertension, History of hypoglycemia





Disposition: 01 HOME / SELF CARE / HOMELESS


Is pt being admited?: No


Does the pt Need Aspirin: No


Condition: Good


Instructions:  Preventing Hypoglycemia, Dementia Caregiver Guide, Hypertension 

(ED)


Additional Instructions: 


Please continue current outpatient medications.  Please make certain to consume 

an appropriate diabetic diet.





The patient's insulin pump was discontinued and removed in the emergency room, 

placed in a specimen bag, and given to the patient's wife.  We recommend that 

the patient's wife follow-up with primary care doctor with the patient, in the 

next 48 to 72 hours for repeat evaluation, and repeat calibration of insulin 

pump.





Please make certain to check sugar at least once every 4-6 hours at home.  

Please make certain to have the patient care for by a competent  adult at all 

times.





Please have your primary care doctor contact the medical records department to 

obtain copies of laboratory studies and radiology studies to follow-up on 

nonemergent incidental findings.





Please follow-up with your neurologist as scheduled.





Please return to the emergency room right away with new pain, worsened pain, 

migration of pain, projectile vomiting, change in mental status, confusion, inab

ility to tolerate liquid feeds, homicidality, suicidality, change in mental 

status, confusion, or any new, worsened or different symptoms not present on the

 initial emergency room evaluation.








Referrals: 


[facility] [Provider Group] - 3-5 Days

## 2021-10-30 NOTE — CAT SCAN REPORT
CT HEAD WITHOUT CONTRAST



INDICATION / CLINICAL INFORMATION: Weakness.



TECHNIQUE: All CT scans at this location are performed using CT dose reduction for ALARA by means of 
automated exposure control. 



COMPARISON: 7/20/2021



FINDINGS:

HEMORRHAGE: None.

EXTRA-AXIAL SPACES: Moderately prominent likely related to cortical atrophy.

VENTRICULAR SYSTEM: Normal in size and morphology for the patient's age.

CEREBRAL PARENCHYMA: No significant abnormality. No acute territorial infarct. 

MIDLINE SHIFT / HERNIATION: None.

CEREBELLUM / BRAINSTEM: No significant abnormality.



ORBITS: Normal as visualized.

SOFT TISSUES: No significant abnormality.

SKULL: No significant abnormality.

PARANASAL SINUSES / MASTOID AIR CELLS: Normal as visualized.



ADDITIONAL FINDINGS: None.



IMPRESSION:

1. There is cortical atrophy. No acute intracranial abnormality.



Signer Name: Ji Koenig MD 

Signed: 10/30/2021 12:23 PM

Workstation Name: [id number]

## 2021-11-02 NOTE — ELECTROCARDIOGRAPH REPORT
[facility]

                                       

Test Date:    2021-10-30               Test Time:    11:08:59

Pat Name:     LANCE DERAS            Department:   

Patient ID:   [id number]          Room:          

Gender:       M                        Technician:   RAEGAN

:          1955               Requested By: BRAYAN MCALLISTER

Order Number: [id number]              Reading MD:   Armand Mackey

                                 Measurements

Intervals                              Axis          

Rate:         72                       P:            30

DC:           173                      QRS:          -22

QRSD:         91                       T:            30

QT:           380                                    

QTc:          416                                    

                           Interpretive Statements

Sinus rhythm

Anterior infarct, old

Compared to ECG 2021 11:26:07

No significant change

Electronically Signed On 2021 18:45:41 EDT by Armand Mackey

## 2021-11-16 ENCOUNTER — HOSPITAL ENCOUNTER (EMERGENCY)
Dept: HOSPITAL 5 - ED | Age: 66
Discharge: HOME | End: 2021-11-16
Payer: MEDICARE

## 2021-11-16 VITALS — SYSTOLIC BLOOD PRESSURE: 169 MMHG | DIASTOLIC BLOOD PRESSURE: 92 MMHG

## 2021-11-16 DIAGNOSIS — Z87.891: ICD-10-CM

## 2021-11-16 DIAGNOSIS — E11.9: ICD-10-CM

## 2021-11-16 DIAGNOSIS — I10: ICD-10-CM

## 2021-11-16 DIAGNOSIS — E16.2: Primary | ICD-10-CM

## 2021-11-16 DIAGNOSIS — F03.90: ICD-10-CM

## 2021-11-16 LAB
ALBUMIN SERPL-MCNC: 4.8 G/DL (ref 3.9–5)
ALT SERPL-CCNC: 25 UNITS/L (ref 7–56)
BASOPHILS # (AUTO): 0.1 K/MM3 (ref 0–0.1)
BASOPHILS NFR BLD AUTO: 0.7 % (ref 0–1.8)
BUN SERPL-MCNC: 8 MG/DL (ref 9–20)
BUN/CREAT SERPL: 10 %
CALCIUM SERPL-MCNC: 10.1 MG/DL (ref 8.4–10.2)
EOSINOPHIL # BLD AUTO: 0.1 K/MM3 (ref 0–0.4)
EOSINOPHIL NFR BLD AUTO: 1.2 % (ref 0–4.3)
HCT VFR BLD CALC: 44.2 % (ref 35.5–45.6)
HEMOLYSIS INDEX: 20
HGB BLD-MCNC: 14.1 GM/DL (ref 11.8–15.2)
LYMPHOCYTES # BLD AUTO: 1.1 K/MM3 (ref 1.2–5.4)
LYMPHOCYTES NFR BLD AUTO: 16 % (ref 13.4–35)
MCHC RBC AUTO-ENTMCNC: 32 % (ref 32–34)
MCV RBC AUTO: 88 FL (ref 84–94)
MONOCYTES # (AUTO): 0.7 K/MM3 (ref 0–0.8)
MONOCYTES % (AUTO): 10.6 % (ref 0–7.3)
PLATELET # BLD: 273 K/MM3 (ref 140–440)
RBC # BLD AUTO: 5.03 M/MM3 (ref 3.65–5.03)

## 2021-11-16 PROCEDURE — 84484 ASSAY OF TROPONIN QUANT: CPT

## 2021-11-16 PROCEDURE — 82962 GLUCOSE BLOOD TEST: CPT

## 2021-11-16 PROCEDURE — 36415 COLL VENOUS BLD VENIPUNCTURE: CPT

## 2021-11-16 PROCEDURE — 85025 COMPLETE CBC W/AUTO DIFF WBC: CPT

## 2021-11-16 PROCEDURE — 80053 COMPREHEN METABOLIC PANEL: CPT

## 2021-11-16 PROCEDURE — 71046 X-RAY EXAM CHEST 2 VIEWS: CPT

## 2021-11-16 PROCEDURE — 99284 EMERGENCY DEPT VISIT MOD MDM: CPT

## 2021-11-16 PROCEDURE — 93005 ELECTROCARDIOGRAM TRACING: CPT

## 2021-11-16 NOTE — EMERGENCY DEPARTMENT REPORT
HPI





- General


Chief Complaint: Medical Clearance


Time Seen by Provider: 21 10:07





- [facility]


HPI: 





Reassessment 6








The patient is 66-year-old male present with a chief complaint of hypoglycemia 

the patient has a history of diabetes and dementia and states he felt dizzy and 

shaky this morning please checked his blood sugar and found out he was low at 

58.  Patient states he cannot remember he took any insulin or ate breakfast this

morning.  The patient called EMS while in the ED patient was found to have a 

blood glucose of 227.  Patient denies nausea/vomiting, fever or cough





ED Past Medical Hx





- Past Medical History


Hx Hypertension: Yes


Hx Diabetes: Yes


Hx Dementia: Yes


Additional medical history: dementia, vertigo





- Surgical History


Additional Surgical History: Large ex lap wound present but patient is unaware 

of what surgery was performed and for what reason





- Family History


Family history: no significant





- Social History


Smoking Status: Former Smoker (None x10-15 years)


Substance Use Type: None (Denies illicit drug use)





- Medications


Home Medications: 


                                Home Medications











 Medication  Instructions  Recorded  Confirmed  Last Taken  Type


 


AtorvaSTATin 40 mg PO HS 10/30/21 10/30/21 Unknown History


 


Lisinopril 20 mg PO DAILY 10/30/21 10/30/21 Unknown History


 


QUEtiapine 25 mg PO HS 10/30/21 10/30/21 Unknown History


 


amLODIPine 10 mg PO DAILY 10/30/21 10/30/21 Unknown History


 


metFORMIN 500 mg PO BID 10/30/21 10/30/21 Unknown History














ED Review of Systems


ROS: 


Stated complaint: Need to be checked out


Other details as noted in HPI





Constitutional: denies: fever


Eyes: denies: eye pain


ENT: denies: throat pain


Respiratory: no symptoms reported


Cardiovascular: denies: chest pain


Endocrine: no symptoms reported


Gastrointestinal: denies: abdominal pain


Musculoskeletal: denies: back pain


Neurological: denies: headache





Physical Exam





- Physical Exam


Vital Signs: 


                                   Vital Signs











  21





  09:55


 


Temperature 98.1 F


 


Pulse Rate 103 H


 


Respiratory 15





Rate 


 


Blood Pressure 169/92





[Left] 


 


O2 Sat by Pulse 99





Oximetry 











Physical Exam: 





GENERAL: The patient is well-developed well-nourished male sitting in chair not 

appearing to be in acute distress. []


HEENT: Normocephalic.  Atraumatic.  Extraocular motions are intact.  Patient has

 moist mucous membranes.


NECK: Supple.  Trachea midline


CHEST/LUNGS: Clear to auscultation.  There is no respiratory distress noted.


HEART/CARDIOVASCULAR: Regular.  There is no tachycardia.  There is no gallop rub

 or murmur.


ABDOMEN: Abdomen is soft, nontender.  Patient has normal bowel sounds.  There is

 no abdominal distention.


SKIN: There is no rash.  There is no edema.  There is no diaphoresis.


NEURO: The patient is awake and alert.  The patient is cooperative.  The patient

 has no focal neurologic deficits.  The patient has normal speech and gait.  

Cranial nerves II through XII grossly intact


MUSCULOSKELETAL:  There is no evidence of acute injury.





ED Course


                                   Vital Signs











  21





  09:55


 


Temperature 98.1 F


 


Pulse Rate 103 H


 


Respiratory 15





Rate 


 


Blood Pressure 169/92





[Left] 


 


O2 Sat by Pulse 99





Oximetry 














ED Medical Decision Making





- Lab Data


Result diagrams: 


                                 21 11:17





                                 21 11:17





                                Laboratory Tests











  21





  11:02 11:17 11:17


 


WBC   6.9 


 


RBC   5.03 


 


Hgb   14.1 


 


Hct   44.2 


 


MCV   88 


 


MCH   28 


 


MCHC   32 


 


RDW   13.9 


 


Plt Count   273 


 


Lymph % (Auto)   16.0 


 


Mono % (Auto)   10.6 H 


 


Eos % (Auto)   1.2 


 


Baso % (Auto)   0.7 


 


Lymph # (Auto)   1.1 L 


 


Mono # (Auto)   0.7 


 


Eos # (Auto)   0.1 


 


Baso # (Auto)   0.1 


 


Seg Neutrophils %   71.5 H 


 


Seg Neutrophils #   4.9 


 


Sodium    138


 


Potassium    3.8


 


Chloride    100.8


 


Carbon Dioxide    22


 


Anion Gap    19


 


BUN    8 L


 


Creatinine    0.8


 


Estimated GFR    > 60


 


BUN/Creatinine Ratio    10


 


Glucose    236 H


 


POC Glucose  227 H  


 


Calcium    10.1


 


Total Bilirubin    0.30


 


AST    13


 


ALT    25


 


Alkaline Phosphatase    106


 


Troponin T    < 0.010


 


Total Protein    8.3 H


 


Albumin    4.8


 


Albumin/Globulin Ratio    1.4














  21





  12:19


 


WBC 


 


RBC 


 


Hgb 


 


Hct 


 


MCV 


 


MCH 


 


MCHC 


 


RDW 


 


Plt Count 


 


Lymph % (Auto) 


 


Mono % (Auto) 


 


Eos % (Auto) 


 


Baso % (Auto) 


 


Lymph # (Auto) 


 


Mono # (Auto) 


 


Eos # (Auto) 


 


Baso # (Auto) 


 


Seg Neutrophils % 


 


Seg Neutrophils # 


 


Sodium 


 


Potassium 


 


Chloride 


 


Carbon Dioxide 


 


Anion Gap 


 


BUN 


 


Creatinine 


 


Estimated GFR 


 


BUN/Creatinine Ratio 


 


Glucose 


 


POC Glucose  206 H


 


Calcium 


 


Total Bilirubin 


 


AST 


 


ALT 


 


Alkaline Phosphatase 


 


Troponin T 


 


Total Protein 


 


Albumin 


 


Albumin/Globulin Ratio 














- EKG Data


-: EKG Interpreted by Me


EKG shows normal: sinus rhythm


Rate: normal (92 bpm)





- EKG Data


When compared to previous EKG there are: previous EKG unavailable


Interpretation: other (PVC.  No ischemic changes seen)





- Radiology Data


Radiology results: report reviewed (Chest x-ray), image reviewed (Chest x-ray)


interpreted by me: 





Chest x-ray-no definite focal infiltrates, no pneumothorax.





[facility] [address] [place] [address] 

XRay Report Signed Patient: LANCE DERAS MR#: [id number] : 1955 

Acct:[id number] Age/Sex: 66 / M ADM Date: 21 Loc: ED Attending Dr: 

Ordering Physician: MANJINDER HODGES Date of Service: 21 Procedure(s): XR 

chest routine 2V Accession Number(s): [id number] cc: MANJINDER HODGES Fluoro Time In 

Minutes: CHEST 2 VIEWS INDICATION: shortness of breath. COMPARISON: 10/30/2021 

FINDINGS: Support devices: None. Heart: Within normal limits. Lungs/pleura: No 

acute air space or interstitial disease. No pneumothorax. Additional findings: 

None. IMPRESSION: Unremarkable chest films. No change since 2021. Signer 

Name: Axel Romero Jr, MD Signed: 2021 10:49 AM Workstation Name: 

[id number] Transcribed By: TTR Dictated By: AXEL ROMERO JR, MD 

Electronically Authenticated By: AXEL ROMERO JR, MD Signed Date/Time: 

21 104 DD/DT: 21 1048 TD/TT: 


Print Cancel 





- Differential Diagnosis


Hypoglycemia


Critical care attestation.: 


If time is entered above; I have spent that time in minutes in the direct care 

of this critically ill patient, excluding procedure time.








ED Disposition


Clinical Impression: 


 Hypoglycemia, Dementia





Disposition: 01 HOME / SELF CARE / HOMELESS


Is pt being admited?: No


Does the pt Need Aspirin: No


Condition: Stable


Additional Instructions: 


Return to the emergency department should you develop worsening symptoms, 

inability to tolerate food or liquids, high fever or any other concerns


Referrals: 


PRIMARY CARE,MD [Primary Care Provider] - 3-5 Days


Time of Disposition: 12:27 (DC to family)

## 2021-11-16 NOTE — XRAY REPORT
CHEST 2 VIEWS 



INDICATION:  shortness of breath.



COMPARISON:  10/30/2021



FINDINGS:

Support devices: None.



Heart: Within normal limits. 

Lungs/pleura: No acute air space or interstitial disease.  No pneumothorax.



Additional findings: None.



IMPRESSION:

Unremarkable chest films. No change since 11/30/2021.



Signer Name: Axel Romero Jr, MD 

Signed: 11/16/2021 10:49 AM

Workstation Name: [id number]

## 2021-11-16 NOTE — EVENT NOTE
ED Screening Note


Date of service: 11/16/21


Time: 10:09


ED Screening Note: 





Patient arrived via EMS for low blood sugar


States he was not feeling right and woke up out of sleep and checked his glucose

and it was 58


Patient is a type I diabetic with an insulin pump


States he began to " freak out" and called 911


Patient states he is just not feeling right


Denies history of anxiety or panic attacks


Blood glucose reported at 240





This initial assessment/diagnostic orders/clinical plan/treatment(s) is/are 

subject to change based on patients health status, clinical progression and re-

assessment by fellow clinical providers in the ED. Further treatment and workup 

at subsequent clinical providers discretion. Patient/guardian urged not to elope

from the ED as their condition may be serious if not clinically assessed and 

managed. 





Initial orders include: 


Labs


EKG


Chest x-ray


UA

## 2021-11-17 NOTE — ELECTROCARDIOGRAPH REPORT
[facility]

                                       

Test Date:    2021               Test Time:    10:39:38

Pat Name:     LANCE DERAS            Department:   

Patient ID:   [id number]          Room:          

Gender:       M                        Technician:   ANKIT COOPERB:          1955               Requested By: MANJINDER HODGES

Order Number: [id number]              Reading MD:   Jose Krause

                                 Measurements

Intervals                              Axis          

Rate:         92                       P:            50

FL:           154                      QRS:          1

QRSD:         86                       T:            9

QT:           365                                    

QTc:          451                                    

                           Interpretive Statements

Sinus rhythm

Ventricular premature complex

Probable left atrial enlargement

Compared to ECG 10/30/2021 11:08:59

Ventricular premature complex(es) now present

Myocardial infarct finding no longer present

Electronically Signed On 2021 8:40:44 EST by Jose Krause

## 2022-01-21 ENCOUNTER — HOSPITAL ENCOUNTER (OUTPATIENT)
Dept: HOSPITAL 5 - ED | Age: 67
Setting detail: OBSERVATION
LOS: 2 days | Discharge: LEFT BEFORE DISCHARGE | End: 2022-01-23
Attending: INTERNAL MEDICINE | Admitting: INTERNAL MEDICINE
Payer: MEDICARE

## 2022-01-21 DIAGNOSIS — Z79.899: ICD-10-CM

## 2022-01-21 DIAGNOSIS — F03.90: ICD-10-CM

## 2022-01-21 DIAGNOSIS — R55: ICD-10-CM

## 2022-01-21 DIAGNOSIS — Z79.82: ICD-10-CM

## 2022-01-21 DIAGNOSIS — F23: ICD-10-CM

## 2022-01-21 DIAGNOSIS — E78.5: ICD-10-CM

## 2022-01-21 DIAGNOSIS — R41.0: ICD-10-CM

## 2022-01-21 DIAGNOSIS — Z98.890: ICD-10-CM

## 2022-01-21 DIAGNOSIS — Z79.4: ICD-10-CM

## 2022-01-21 DIAGNOSIS — Z79.84: ICD-10-CM

## 2022-01-21 DIAGNOSIS — R07.89: Primary | ICD-10-CM

## 2022-01-21 DIAGNOSIS — E11.9: ICD-10-CM

## 2022-01-21 DIAGNOSIS — Z87.891: ICD-10-CM

## 2022-01-21 DIAGNOSIS — E66.9: ICD-10-CM

## 2022-01-21 DIAGNOSIS — I10: ICD-10-CM

## 2022-01-21 LAB
ALBUMIN SERPL-MCNC: 4.3 G/DL (ref 3.9–5)
ALT SERPL-CCNC: 23 UNITS/L (ref 7–56)
APTT BLD: 27.9 SEC. (ref 24.2–36.6)
BASOPHILS # (AUTO): 0.1 K/MM3 (ref 0–0.1)
BASOPHILS # (AUTO): 0.1 K/MM3 (ref 0–0.1)
BASOPHILS NFR BLD AUTO: 0.9 % (ref 0–1.8)
BASOPHILS NFR BLD AUTO: 1.2 % (ref 0–1.8)
BILIRUB UR QL STRIP: (no result)
BLOOD UR QL VISUAL: (no result)
BUN SERPL-MCNC: 11 MG/DL (ref 9–20)
BUN/CREAT SERPL: 12 %
CALCIUM SERPL-MCNC: 9.6 MG/DL (ref 8.4–10.2)
EOSINOPHIL # BLD AUTO: 0.1 K/MM3 (ref 0–0.4)
EOSINOPHIL # BLD AUTO: 0.2 K/MM3 (ref 0–0.4)
EOSINOPHIL NFR BLD AUTO: 2.2 % (ref 0–4.3)
EOSINOPHIL NFR BLD AUTO: 2.5 % (ref 0–4.3)
HCT VFR BLD CALC: 42.3 % (ref 35.5–45.6)
HCT VFR BLD CALC: 43.5 % (ref 35.5–45.6)
HEMOLYSIS INDEX: 3
HGB BLD-MCNC: 13.7 GM/DL (ref 11.8–15.2)
HGB BLD-MCNC: 14 GM/DL (ref 11.8–15.2)
INR PPP: 1 (ref 0.87–1.13)
LYMPHOCYTES # BLD AUTO: 1.6 K/MM3 (ref 1.2–5.4)
LYMPHOCYTES # BLD AUTO: 1.8 K/MM3 (ref 1.2–5.4)
LYMPHOCYTES NFR BLD AUTO: 23.7 % (ref 13.4–35)
LYMPHOCYTES NFR BLD AUTO: 27.9 % (ref 13.4–35)
MCHC RBC AUTO-ENTMCNC: 32 % (ref 32–34)
MCHC RBC AUTO-ENTMCNC: 32 % (ref 32–34)
MCV RBC AUTO: 86 FL (ref 84–94)
MCV RBC AUTO: 86 FL (ref 84–94)
MONOCYTES # (AUTO): 0.6 K/MM3 (ref 0–0.8)
MONOCYTES # (AUTO): 0.7 K/MM3 (ref 0–0.8)
MONOCYTES % (AUTO): 10.1 % (ref 0–7.3)
MONOCYTES % (AUTO): 11.1 % (ref 0–7.3)
PH UR STRIP: 7 [PH] (ref 5–7)
PLATELET # BLD: 259 K/MM3 (ref 140–440)
PLATELET # BLD: 281 K/MM3 (ref 140–440)
PROT UR STRIP-MCNC: (no result) MG/DL
RBC # BLD AUTO: 4.94 M/MM3 (ref 3.65–5.03)
RBC # BLD AUTO: 5.07 M/MM3 (ref 3.65–5.03)
RBC #/AREA URNS HPF: 1 /HPF (ref 0–6)
UROBILINOGEN UR-MCNC: < 2 MG/DL (ref ?–2)
WBC #/AREA URNS HPF: 1 /HPF (ref 0–6)

## 2022-01-21 PROCEDURE — 71045 X-RAY EXAM CHEST 1 VIEW: CPT

## 2022-01-21 PROCEDURE — G0480 DRUG TEST DEF 1-7 CLASSES: HCPCS

## 2022-01-21 PROCEDURE — 81001 URINALYSIS AUTO W/SCOPE: CPT

## 2022-01-21 PROCEDURE — 93306 TTE W/DOPPLER COMPLETE: CPT

## 2022-01-21 PROCEDURE — 99285 EMERGENCY DEPT VISIT HI MDM: CPT

## 2022-01-21 PROCEDURE — 85610 PROTHROMBIN TIME: CPT

## 2022-01-21 PROCEDURE — 36415 COLL VENOUS BLD VENIPUNCTURE: CPT

## 2022-01-21 PROCEDURE — 84484 ASSAY OF TROPONIN QUANT: CPT

## 2022-01-21 PROCEDURE — 70450 CT HEAD/BRAIN W/O DYE: CPT

## 2022-01-21 PROCEDURE — 80307 DRUG TEST PRSMV CHEM ANLYZR: CPT

## 2022-01-21 PROCEDURE — 80320 DRUG SCREEN QUANTALCOHOLS: CPT

## 2022-01-21 PROCEDURE — 82550 ASSAY OF CK (CPK): CPT

## 2022-01-21 PROCEDURE — 85730 THROMBOPLASTIN TIME PARTIAL: CPT

## 2022-01-21 PROCEDURE — 87086 URINE CULTURE/COLONY COUNT: CPT

## 2022-01-21 PROCEDURE — 96372 THER/PROPH/DIAG INJ SC/IM: CPT

## 2022-01-21 PROCEDURE — G0378 HOSPITAL OBSERVATION PER HR: HCPCS

## 2022-01-21 PROCEDURE — 82140 ASSAY OF AMMONIA: CPT

## 2022-01-21 PROCEDURE — 80048 BASIC METABOLIC PNL TOTAL CA: CPT

## 2022-01-21 PROCEDURE — 93005 ELECTROCARDIOGRAM TRACING: CPT

## 2022-01-21 PROCEDURE — 82962 GLUCOSE BLOOD TEST: CPT

## 2022-01-21 PROCEDURE — 85025 COMPLETE CBC W/AUTO DIFF WBC: CPT

## 2022-01-21 PROCEDURE — 84443 ASSAY THYROID STIM HORMONE: CPT

## 2022-01-21 PROCEDURE — 80053 COMPREHEN METABOLIC PANEL: CPT

## 2022-01-21 NOTE — CAT SCAN REPORT
CT head/brain wo con



INDICATION / CLINICAL INFORMATION:

66 years Male; Altered Mental Status. 



TECHNIQUE: Routine CT head without contrast. All CT scans at this location are performed using CT dos
e reduction for ALARA by means of automated exposure control. 



COMPARISON: 

The study is compared to previous CT of 10/30/2021.



FINDINGS:



BRAIN / INTRACRANIAL CONTENTS: The motion degrades the image quality. There is prominence of the CSF 
attenuation along the cerebral convexities most consistent with cerebral atrophy. The findings appear
 to correlate with the previous CT. There is no clear evidence of acute intracranial hemorrhage or si
gnificant mass effect. The brain parenchyma appears to demonstrate appropriate attenuation for age. 



ORBITS: No significant abnormality of visualized orbits.

SINUSES / MASTOIDS: No significant abnormality in the visualized paranasal sinuses or mastoid air beena
ls.



CRANIOCERVICAL JUNCTION: No significant abnormality.

ADDITIONAL FINDINGS: None. 



IMPRESSION:

1. There is cerebral atrophy as detailed above. There is no clear CT evidence of acute intracranial p
rocess.



Signer Name: Jeff Richards MD 

Signed: 1/21/2022 8:33 PM

Workstation Name: [id number]

## 2022-01-21 NOTE — HISTORY AND PHYSICAL REPORT
History of Present Illness


Date of examination: 01/21/22


Date of admission: 


01/21/2022


Chief complaint: 


Chest Pain


Confusion





History of present illness: 





66-year-old male with known history of diabetes mellitus, 

dyslipidemia,hypertension, dementia brought into the emergency room today by EMS

because of declining cognitive status and paranoia.  Patient was said to have 

called 911 requesting days to bridge mostly of his house.


Patient appears confused and unable to give any good history.  Most of the 

history was given by the ER staff.


Per his family patient has had no fever or chills, no nausea or vomiting, no 

diarrhea and no abdominal pain.  Patient is fully vaccinated against COVID-19.





Upon arrival in the emergency room, patient was confused and was complaining of 

chest pressure and near syncope.


Wife has the power of  and she is also medical decision-maker.  There 

had been discussion in the past about placement in a nursing home or personal 

care home.  Patient has home health care twice a week.





Work-up in the emergency room today, chest x-ray shows no acute abnormality.  CT

scan of the head shows no acute abnormality.


Labs were also unremarkable.





Patient has been admitted for chest pain and changes in mental status.





Past History


Past Medical History: diabetes, hypertension, hyperlipidemia, other (Dementia, 

vertigo)


Past Surgical History: Other (Exploratory laparotomy)


Social history: smoking (Former smoker)


Family history: no significant family history





Medications and Allergies


                                    Allergies











Allergy/AdvReac Type Severity Reaction Status Date / Time


 


No Known Allergies Allergy   Verified 01/21/22 22:11











                                Home Medications











 Medication  Instructions  Recorded  Confirmed  Last Taken  Type


 


AtorvaSTATin 40 mg PO HS 10/30/21 10/30/21 Unknown History


 


Lisinopril 20 mg PO DAILY 10/30/21 10/30/21 Unknown History


 


QUEtiapine 25 mg PO HS 10/30/21 10/30/21 Unknown History


 


amLODIPine 10 mg PO DAILY 10/30/21 10/30/21 Unknown History


 


metFORMIN 500 mg PO BID 10/30/21 10/30/21 Unknown History











Active Meds: 


Active Medications





Acetaminophen (Acetaminophen 325 Mg Tab)  650 mg PO Q4H PRN


   PRN Reason: Pain MILD(1-3)/Fever >100.5/HA


Acetaminophen (Acetaminophen 325 Mg Tab)  650 mg PO Q6H PRN


   PRN Reason: Pain, Mild (1-3)


Aspirin (Aspirin Ec 325 Mg Tab)  325 mg PO QDAY MADINA


Dextrose (Dextrose 50% In Water (25gm) 50 Ml Syringe)  50 ml IV Q30MIN PRN; 

Protocol


   PRN Reason: Hypoglycemia


Dextrose (Dextrose 50% In Water (25gm) 50 Ml Syringe)  50 ml IV Q30MIN PRN; 

Protocol


   PRN Reason: Hypoglycemia


Insulin Human Lispro (Insulin Lispro 100 Unit/Ml)  0 unit SUB-Q ACHS MADINA; 

Protocol


Magnesium Hydroxide (Magnesium Hydroxide (Mom) Oral Liqd Udc)  30 ml PO Q4H PRN


   PRN Reason: Constipation


Morphine Sulfate (Morphine 2 Mg/1 Ml Inj)  2 mg IV Q4H PRN


   PRN Reason: Pain, Moderate (4-6)


Morphine Sulfate (Morphine 4 Mg/1 Ml Inj)  4 mg IV Q4H PRN


   PRN Reason: Pain , Severe (7-10)


Morphine Sulfate (Morphine 4 Mg/1 Ml Inj)  2 mg IV Q5MIN PRN


   PRN Reason: Chest Pain unrelieved by NTG


Nitroglycerin (Nitroglycerin 0.4 Mg Tab Subl)  0.4 mg SL .Q5MIN PRN


   PRN Reason: Chest Pain


Nitroglycerin (Nitroglycerin 0.4 Mg Tab Subl)  0.4 mg SL Q5M PRN


   PRN Reason: Chest Pain


Ondansetron HCl (Ondansetron 4 Mg/2 Ml Inj)  4 mg IV Q8H PRN


   PRN Reason: Nausea And Vomiting


Sodium Chloride (Sodium Chloride 0.9% 10 Ml Flush Syringe)  10 ml IV BID MADINA


Sodium Chloride (Sodium Chloride 0.9% 10 Ml Flush Syringe)  10 ml IV PRN PRN


   PRN Reason: LINE FLUSH


Sodium Chloride (Sodium Chloride 0.9% 10 Ml Flush Syringe)  10 ml IV PRN PRN


   PRN Reason: LINE FLUSH


Tramadol HCl (Tramadol 50 Mg Tab)  50 mg PO Q6H PRN


   PRN Reason: Pain, Moderate (4-6)











Review of Systems


Constitutional: no fever, no chills


Ears, nose, mouth and throat: no nasal congestion, no sore throat


Cardiovascular: chest pain, no palpitations


Respiratory: no cough, no shortness of breath


Genitourinary Male: no dysuria, no hematuria, no flank pain, no nocturia


Musculoskeletal: no neck pain, no low back pain


Integumentary: no rash, no pruritis


Neurological: no headaches, no confusion


Psychiatric: confusion, no anxiety, no depression


Endocrine: no polyphagia, no polydipsia, no polyuria, no nocturia





Exam





- Constitutional


Vitals: 


                                        











Temp Pulse Resp BP Pulse Ox


 


 98.2 F   104 H  18   150/107   100 


 


 01/21/22 22:20  01/21/22 22:20  01/21/22 22:20  01/21/22 22:20  01/21/22 22:20











General appearance: Present: no acute distress, well-nourished





- EENT


Eyes: Present: PERRL, EOM intact.  Absent: scleral icterus


ENT: hearing intact, clear oral mucosa, dentition normal





- Neck


Neck: Present: supple, normal ROM





- Respiratory


Respiratory effort: normal


Respiratory: bilateral: CTA





- Cardiovascular


Rhythm: regular


Heart Sounds: Present: S1 & S2.  Absent: gallop, systolic murmur, diastolic 

murmur, rub, click





- Extremities


Extremities: no ischemia, pulses intact, pulses symmetrical, No edema, normal 

temperature, normal color, Full ROM


Peripheral Pulses: within normal limits





- Abdominal


General gastrointestinal: Present: soft, non-tender, non-distended, normal bowel

sounds.  Absent: mass





- Integumentary


Integumentary: Present: clear, warm, dry.  Absent: rash





- Musculoskeletal


Musculoskeletal: strength equal bilaterally





- Psychiatric


Psychiatric: cooperative, other (Confused)





- Neurologic


Neurologic: CNII-XII intact, no focal deficits, moves all extremities





HEART Score





- HEART Score


History: Slightly suspicious


EKG: Non-specific


Age: > 65


Risk factors: > 3 risk factors or hx of atherosclerotic disease


Troponin: 


                                        











Troponin T  < 0.010 ng/mL (0.00-0.029)   01/21/22  20:00    











Troponin: < normal limit


HEART Score: 5





- Critical Actions


Critical Actions: 4-6 pts:12-16.6% risk of adverse cardiac event. Should be 

admitted





Results





- Labs


CBC & Chem 7: 


                                 01/21/22 23:08





                                 01/21/22 23:08


Labs: 


                              Abnormal lab results











  01/21/22 01/21/22 01/21/22 Range/Units





  20:00 20:00 20:00 


 


RBC  5.07 H    (3.65-5.03)  M/mm3


 


Mono % (Auto)  11.1 H    (0.0-7.3)  %


 


Glucose   204 H   ()  mg/dL


 


Salicylates    < 0.3 L  (2.8-20.0)  mg/dL


 


Acetaminophen     (10.0-30.0)  ug/mL














  01/21/22 Range/Units





  20:00 


 


RBC   (3.65-5.03)  M/mm3


 


Mono % (Auto)   (0.0-7.3)  %


 


Glucose   ()  mg/dL


 


Salicylates   (2.8-20.0)  mg/dL


 


Acetaminophen  5.0 L  (10.0-30.0)  ug/mL














Assessment and Plan





- Patient Problems


(1) Chest pain


Current Visit: Yes   Status: Acute   


Plan to address problem: 


We will check serial cardiac enzymes.


Patient placed on daily aspirin,Sublingual nitroglycerin and IV morphine as 

needed for chest pain.








(2) Behavior concern


Current Visit: Yes   Status: Acute   


Plan to address problem: 


Possibly related to the underlying dementia.


Consult will be placed to mental health for evaluation.


We will also consult case management for possible placement.








(3) Diabetes mellitus


Current Visit: Yes   Status: Acute   


Plan to address problem: 


Will monitor Accu-Cheks closely.








(4) Hypertension


Current Visit: Yes   Status: Acute   


Plan to address problem: 


Resume routine home medications and monitor vital signs closely.








(5) DVT prophylaxis


Current Visit: Yes   Status: Acute   


Plan to address problem: 


Patient placed on subcutaneous heparin.








(6) Full code status


Current Visit: Yes   Status: Acute   


Plan to address problem: 


Patient is full code.

## 2022-01-21 NOTE — EMERGENCY DEPARTMENT REPORT
ED General Adult HPI





- General


Chief complaint: Altered Mental Status


Stated complaint: AGGITATION


Time Seen by Provider: 01/21/22 19:24


Source: patient, family, EMS (Verbal report received from emergency medical 

services.), RN notes reviewed, old records reviewed


Mode of arrival: Stretcher


Limitations: Altered Mental Status





- History of Present Illness


Initial comments: 





The patient was evaluated in the emergency department for symptoms described in 

the history of present illness.  He/she was evaluated in the context of the 

global COVID-19 pandemic, which necessitated consideration that the patient 

might be at risk for infection with the virus that causes COVID-19.  

Institutional protocols and algorithms that pertain to the evaluation of patient

s at risk for COVID-19 are in a state of rapid change based on information 

released by regulatory bodies including the CDC and federal and state 

organizations.  These policies and algorithms were followed during the patient's

care in the emergency department.  Please note that these policies, procedures 

and recommendations changed on a rapid basis.





History obtained from patient's daughter, Ms. Monzon; [phone], as well as by 

speaking to his wife, Ms. Trudi Monzon; [phone]





The patient is a 66-year-old gentleman with a history of dementia, diabetes, 

hypertension and high cholesterol.  He is brought to the hospital today by 

emergency medical services.  As per his daughter, and his wife, the patient 

called 911 because he wants his children out of his house.  His wife, Ms. Trudi Monzon states that she is the patient's power of , and medical 

decision-maker, but she reports that they are .





His daughter states that the patient called 911 because he wants the children 

out of the house.  As per his wife and daughter, patient has had a gradual 

decline in functional status, becoming very paranoid about multiple physical and

perceived medical maladies, including tinnitus, and nonspecific multiple pains. 

As per his family, no fever, nausea, vomiting or diarrhea, and he is COVID-19 

vaccinated.





The patient himself has no recollection of the event.  In the emergency room, he

is confused, and complains of "pinching" chest pressure.  Family denies travel, 

surgery, immobilization, leg pain/leg swelling, DVT and pulmonary embolism risk 

factors.  As per his wife, Ms. Trudi Na, he is not maintained on any 

psychiatric medications.  He is specifically discontinued on 

Seroquel/quetiapine.





Discussion has been had in the past about placement in nursing home or personal-

care home, but this has not happened as of yet.  Patient's family states that 

the patient has home health services twice a week.





EMS reports normal Accu-Chek in the field.  EMS further reports that the patient

was ambulatory with an unsteady gait in the field, and required a two-person 

assist


-: Gradual, Sudden


Location: chest


Severity scale (0 -10): 0


Quality: aching


Consistency: intermittent


Improves with: none


Worsens with: none





- Related Data


                                Home Medications











 Medication  Instructions  Recorded  Confirmed  Last Taken


 


AtorvaSTATin 40 mg PO HS 10/30/21 01/22/22 Unknown


 


Lisinopril 20 mg PO DAILY 10/30/21 01/22/22 Unknown


 


QUEtiapine 25 mg PO HS 10/30/21 10/30/21 Unknown


 


amLODIPine 10 mg PO DAILY 10/30/21 01/22/22 Unknown


 


metFORMIN 500 mg PO BID 10/30/21 01/22/22 Unknown








                                  Previous Rx's











 Medication  Instructions  Recorded  Last Taken  Type


 


Aspirin EC [Halfprin EC] 81 mg PO QDAY #30 tablet. 01/23/22 Unknown Rx











                                    Allergies











Allergy/AdvReac Type Severity Reaction Status Date / Time


 


No Known Allergies Allergy   Verified 01/21/22 22:11














ED Review of Systems


ROS: 


Stated complaint: AGGITATION


Other details as noted in HPI





Constitutional: malaise, weakness


Eyes: denies: eye discharge


ENT: denies: epistaxis


Respiratory: denies: cough


Cardiovascular: chest pain


Gastrointestinal: denies: abdominal pain, vomiting


Genitourinary: denies: dysuria, frequency


Neurological: weakness, confusion


Psychiatric: anxiety.  denies: homicidal thoughts, suicidal thoughts





ED Past Medical Hx





- Past Medical History


Hx Hypertension: Yes


Hx Diabetes: Yes


Hx Dementia: Yes


Additional medical history: dementia, vertigo





- Surgical History


Additional Surgical History: Large ex lap wound present but patient is unaware 

of what surgery was performed and for what reason





- Social History


Smoking Status: Former Smoker (None x10-15 years)


Substance Use Type: None (Denies illicit drug use)





- Medications


Home Medications: 


                                Home Medications











 Medication  Instructions  Recorded  Confirmed  Last Taken  Type


 


AtorvaSTATin 40 mg PO HS 10/30/21 01/22/22 Unknown History


 


Lisinopril 20 mg PO DAILY 10/30/21 01/22/22 Unknown History


 


QUEtiapine 25 mg PO HS 10/30/21 10/30/21 Unknown History


 


amLODIPine 10 mg PO DAILY 10/30/21 01/22/22 Unknown History


 


metFORMIN 500 mg PO BID 10/30/21 01/22/22 Unknown History


 


Aspirin EC [Halfprin EC] 81 mg PO QDAY #30 tablet. 01/23/22  Unknown Rx














ED Physical Exam





- General


Limitations: Altered Mental Status, Other (Patient is demented.  Patient is poor

historian.)


General appearance: anxious





- Head


Head exam: Present: atraumatic, normocephalic





- Eye


Eye exam: Present: normal appearance, EOMI.  Absent: nystagmus





- ENT


ENT exam: Present: normal exam, normal orophraynx, mucous membranes moist, 

normal external ear exam





- Neck


Neck exam: Present: normal inspection, full ROM.  Absent: tenderness, 

meningismus





- Respiratory


Respiratory exam: Present: normal lung sounds bilaterally.  Absent: respiratory 

distress, wheezes, rales, rhonchi, stridor, decreased breath sounds





- Cardiovascular


Cardiovascular Exam: Present: regular rate, normal rhythm, normal heart sounds. 

Absent: bradycardia, tachycardia, irregular rhythm, systolic murmur, diastolic 

murmur, rubs, gallop





- GI/Abdominal


GI/Abdominal exam: Present: soft.  Absent: distended, tenderness, guarding, 

rebound, rigid, pulsatile mass





- Rectal


Rectal exam: Present: deferred





- Extremities Exam


Extremities exam: Present: normal inspection, full ROM, pedal edema (1+ edema in

the bilateral lower extremities), other (2+ pulses noted in the bilateral upper 

and lower extremities.  There is no palpable cord.   negative Homans sign.  M

uscular compartments are soft.  The pelvis is stable.).  Absent: calf tenderness





- Back Exam


Back exam: Present: normal inspection.  Absent: tenderness, CVA tenderness (R), 

CVA tenderness (L), paraspinal tenderness, vertebral tenderness





- Neurological Exam


Neurological exam: Present: altered, other (No facial droop.  Tongue midline.  

Extraocular movements intact bilaterally.  Facial sensation intact to light 

touch in V1, V2, V3 distribution bilaterally.  5 and a 5 strength in 4 

extremities.  Sensation intact to light touch in 4 extremities.)





- Psychiatric


Psychiatric exam: Present: anxious.  Absent: homicidal ideation, suicidal 

ideation





- Skin


Skin exam: Present: warm, dry, intact, normal color.  Absent: rash





ED Course


                                   Vital Signs











  01/21/22 01/21/22 01/21/22





  19:11 21:59 22:01


 


Temperature 98.4 F  


 


Pulse Rate 95 H 106 H 107 H


 


Respiratory 19 22 20





Rate   


 


Blood Pressure   


 


Blood Pressure 181/103  





[Left]   


 


O2 Sat by Pulse   





Oximetry   














  01/21/22 01/21/22 01/21/22





  22:15 22:20 22:31


 


Temperature  98.2 F 


 


Pulse Rate 121 H 104 H 88


 


Respiratory 15 18 10 L





Rate   


 


Blood Pressure   153/98


 


Blood Pressure  150/107 





[Left]   


 


O2 Sat by Pulse  100 95





Oximetry   














  01/21/22 01/21/22 01/21/22





  22:45 23:01 23:15


 


Temperature   


 


Pulse Rate 85 90 85


 


Respiratory 16 19 14





Rate   


 


Blood Pressure 154/89 151/90 154/85


 


Blood Pressure   





[Left]   


 


O2 Sat by Pulse 95 96 96





Oximetry   














  01/21/22 01/21/22 01/21/22





  23:17 23:31 23:45


 


Temperature   


 


Pulse Rate 93 H 87 87


 


Respiratory 9 L 15 18





Rate   


 


Blood Pressure 154/85 157/87 141/80


 


Blood Pressure   





[Left]   


 


O2 Sat by Pulse 96 97 97





Oximetry   














  01/22/22 01/22/22 01/22/22





  00:01 00:15 00:31


 


Temperature   


 


Pulse Rate 89 87 92 H


 


Respiratory 21 17 21





Rate   


 


Blood Pressure 140/75 138/81 130/85


 


Blood Pressure   





[Left]   


 


O2 Sat by Pulse 96 97 95





Oximetry   














  01/22/22 01/22/22 01/22/22





  00:45 01:01 01:15


 


Temperature   


 


Pulse Rate 88 86 89


 


Respiratory 20 17 19





Rate   


 


Blood Pressure 130/77 142/82 143/85


 


Blood Pressure   





[Left]   


 


O2 Sat by Pulse 100  





Oximetry   














  01/22/22





  01:31


 


Temperature 


 


Pulse Rate 87


 


Respiratory 13





Rate 


 


Blood Pressure 148/86


 


Blood Pressure 





[Left] 


 


O2 Sat by Pulse 





Oximetry 














- Reevaluation(s)


Reevaluation #1: 





01/21/22 19:46


Differential diagnosis, including but not limited to: Pneumonia, urinary tract 

infection, orthostasis, vagal event, structural cardiac disease, dementia, 

electrolyte derangement, thyroid derangement





Assessment and plan: 66-year-old gentleman, who was afebrile and hypertensive, 

confused, with complaints of behavioral issues, near syncope, chest pain.





I suspect that this is likely secondary to worsening dementia.  I discussed this

 with the patient's daughter and the patient's wife.





Family member state that this patient is up-to-date with COVID-19 vaccinations. 

 He is not currently tachycardic, tachypneic or hypoxic, he does not have DVT or

 pulmonary embolism risk factors, and he is low risk by Wells criteria for 

pulmonary embolism.  He also had a CT scan of his chest in 2021 which was n

egative for pulmonary embolism.





As per discussion with his wife, Ms. Trudi Monzon, currently, does not have a 

signed DNR/DNI, although she indicates that she believes the patient's wishes 

would be to be DNR/DNI if terminally ill.  However, at the moment, the patient 

is full code.





Patient is at moderate risk for major adverse cardiac event as per heart score. 

 No recent cardiac risk ratification performed.





Obtain appropriate laboratory studies, EKG, urinalysis and chest x-ray.  Place 

patient on cardiac monitor.  Obtain mental health consultation and evaluation.





Admit the patient to the medical service for cardiac restratification.  I 

discussed this with the patient's wife and with his daughter.  They articulated 

understanding








01/21/22 21:05


Care transferred to oncoming ER provider to contact hospital physician to 

arrange admission


Reevaluation #2: 





01/21/22 21:25


Dr HANNAH Pérez to admit to [facility]





ED Medical Decision Making





- Lab Data


Result diagrams: 


                                 01/22/22 05:50





                                 01/22/22 05:50








                                   Vital Signs











  01/21/22





  19:11


 


Temperature 98.4 F


 


Pulse Rate 95 H


 


Respiratory 19





Rate 


 


Blood Pressure 181/103





[Left] 














- EKG Data


-: EKG Interpreted by Me


EKG shows normal: sinus rhythm


Rate: normal





- EKG Data


Interpretation: unchanged when compared t





01/21/22 20:37


The EKG today is interpreted at 20: 30





The EKG today is unchanged from prior EKG from November 2021





Sinus rhythm, rate 79 bpm.  Left axis deviation.  Left anterior fascicular 

block.  Intervals within normal limits.  Not a STEMI





- Radiology Data


Radiology results: report reviewed, image reviewed





X-ray of the chest is negative for acute findings.








CT head/brain wo con  INDICATION / CLINICAL INFORMATION: 66 years Male; Altered 

Mental Status.  TECHNIQUE: Routine CT head without contrast. All CT scans at 

this location are performed using CT dose reduction for ALARA by means of 

automated exposure control.  COMPARISON: The study is compared to previous CT of

 10/30/2021.  FINDINGS:  BRAIN / INTRACRANIAL CONTENTS: The motion degrades the 

image quality. There is prominence of the CSF attenuation along the cerebral 

convexities most consistent with cerebral atrophy. The findings appear to 

correlate with the previous CT. There is no clear evidence of acute intracranial

 hemorrhage or significant mass effect. The brain parenchyma appears to 

demonstrate appropriate attenuation for age.  ORBITS: No significant abnormality

 of visualized orbits. SINUSES / MASTOIDS: No significant abnormality in the 

visualized paranasal sinuses or mastoid air cells.  CRANIOCERVICAL JUNCTION: No 

significant abnormality. ADDITIONAL FINDINGS: None.  IMPRESSION: 1. There is 

cerebral atrophy as detailed above. There is no clear CT evidence of acute 

intracranial process.  Signer Name: Jeff Richards MD Signed: 1/21/2022 7:33

 PM Workstation Name: [id number]








CHEST 1 VIEW 1/21/2022 6:45 PM  INDICATION / CLINICAL INFORMATION: Altered 

Mental Status.  COMPARISON: 2 views of the chest from 11/16/2021.  FINDINGS:  

SUPPORT DEVICES: None. HEART / MEDIASTINUM: No significant abnormality. LUNGS / 

PLEURA: No significant pulmonary abnormality. No significant pleural effusion. 

No pneumothorax.  ADDITIONAL FINDINGS: No significant additional findings.  

IMPRESSION: 1. No acute abnormality of the chest.  Signer Name: Carlos Del Valle MD

 Signed: 1/21/2022 6:48 PM Workstation Name: [id number]


Critical care attestation.: 


If time is entered above; I have spent that time in minutes in the direct care 

of this critically ill patient, excluding procedure time.








ED Disposition


Clinical Impression: 


 Chest pain, Near syncope, Dementia, Behavior concern





Disposition: 09 ADMITTED AS INPATIENT


Is pt being admited?: Yes


Condition: Good





Heart Score





- HEART Score


History: Slightly suspicious


EKG: Non-specific


Age: > 65


Risk factors: > 3 risk factors or hx of atherosclerotic disease


Troponin: < normal limit


HEART Score: 5





- EKG Read Time


Time EKG Completed: 20:30


EKG Read Time: 20:31





- Critical Actions


Critical Actions: 4-6 pts:12-16.6% risk of adverse cardiac event. Should be 

admitted

## 2022-01-21 NOTE — XRAY REPORT
CHEST 1 VIEW 1/21/2022 6:45 PM



INDICATION / CLINICAL INFORMATION:

Altered Mental Status.



COMPARISON: 

2 views of the chest from 11/16/2021.



FINDINGS:



SUPPORT DEVICES: None.

HEART / MEDIASTINUM: No significant abnormality. 

LUNGS / PLEURA: No significant pulmonary abnormality. No significant pleural effusion. No pneumothora
x. 



ADDITIONAL FINDINGS: No significant additional findings.



IMPRESSION:

1. No acute abnormality of the chest.



Signer Name: Carlos Del Valle MD 

Signed: 1/21/2022 7:48 PM

Workstation Name: [id number]

## 2022-01-22 LAB
BASOPHILS # (AUTO): 0 K/MM3 (ref 0–0.1)
BASOPHILS NFR BLD AUTO: 0.7 % (ref 0–1.8)
BUN SERPL-MCNC: 10 MG/DL (ref 9–20)
BUN SERPL-MCNC: 11 MG/DL (ref 9–20)
BUN/CREAT SERPL: 11 %
BUN/CREAT SERPL: 14 %
CALCIUM SERPL-MCNC: 9.6 MG/DL (ref 8.4–10.2)
CALCIUM SERPL-MCNC: 9.7 MG/DL (ref 8.4–10.2)
EOSINOPHIL # BLD AUTO: 0.2 K/MM3 (ref 0–0.4)
EOSINOPHIL NFR BLD AUTO: 3.7 % (ref 0–4.3)
HCT VFR BLD CALC: 39.2 % (ref 35.5–45.6)
HEMOLYSIS INDEX: 1
HEMOLYSIS INDEX: 22
HGB BLD-MCNC: 12.7 GM/DL (ref 11.8–15.2)
LYMPHOCYTES # BLD AUTO: 1.5 K/MM3 (ref 1.2–5.4)
LYMPHOCYTES NFR BLD AUTO: 29.4 % (ref 13.4–35)
MCHC RBC AUTO-ENTMCNC: 33 % (ref 32–34)
MCV RBC AUTO: 86 FL (ref 84–94)
MONOCYTES # (AUTO): 0.6 K/MM3 (ref 0–0.8)
MONOCYTES % (AUTO): 11.9 % (ref 0–7.3)
PLATELET # BLD: 249 K/MM3 (ref 140–440)
RBC # BLD AUTO: 4.58 M/MM3 (ref 3.65–5.03)

## 2022-01-22 RX ADMIN — Medication SCH ML: at 22:08

## 2022-01-22 RX ADMIN — INSULIN LISPRO SCH: 100 INJECTION, SOLUTION INTRAVENOUS; SUBCUTANEOUS at 07:50

## 2022-01-22 RX ADMIN — INSULIN LISPRO SCH UNIT: 100 INJECTION, SOLUTION INTRAVENOUS; SUBCUTANEOUS at 16:46

## 2022-01-22 RX ADMIN — INSULIN LISPRO SCH UNIT: 100 INJECTION, SOLUTION INTRAVENOUS; SUBCUTANEOUS at 11:52

## 2022-01-22 RX ADMIN — Medication SCH ML: at 11:00

## 2022-01-22 RX ADMIN — INSULIN LISPRO SCH UNIT: 100 INJECTION, SOLUTION INTRAVENOUS; SUBCUTANEOUS at 22:08

## 2022-01-22 NOTE — CONSULTATION
History of Present Illness





- Reason for Consult


Consult date: 01/22/22


Reason for consult: Dementia





- Chief Complaint


Chief complaint: 


Chest Pain


Confusion








- History of Present Psychiatric Illness


ED Note: The patient is a 66-year-old gentleman with a history of dementia, 

diabetes, hypertension and high cholesterol.  He is brought to the hospital 

today by emergency medical services.  As per his daughter, and his wife, the 

patient called 911 because he wants his children out of his house.  His wife, 

Ms. Trudi Monzon states that she is the patient's power of , and 

medical decision-maker, but she reports that they are .


His daughter states that the patient called 911 because he wants the children 

out of the house.  As per his wife and daughter, patient has had a gradual 

decline in functional status, becoming very paranoid about multiple physical and

perceived medical maladies, including tinnitus, and nonspecific multiple pains. 

As per his family, no fever, nausea, vomiting or diarrhea, and he is COVID-19 

vaccinated.The patient himself has no recollection of the event.  In the 

emergency room, he is confused, and complains of "pinching" chest pressure.  

Family denies travel, surgery, immobilization, leg pain/leg swelling, DVT and 

pulmonary embolism risk factors.  As per his wife, Ms. Trudi Monzon, he is not 

maintained on any psychiatric medications.  He is specifically discontinued on 

Seroquel/quetiapine. Discussion has been had in the past about placement in nurs

[facility] home or personal-care home, but this has not happened as of yet.  Patient's 

family states that the patient has home health services twice a week.








Gustavo Monzon is a 66 year old male with history of dementia. In my interview 

with the patient he is calm, alert and oriented to self. The patient is 

forgetful  and confused unable to states why he came to the ED. The patient 

states he is doing well. He denies any current suicidal/homicidal ideation and 

denies hallucinations. 





Collateral received from Ex- wife Mrs. Hilary Monzon states that the patient was

diagnosed with dementia in October 2020. States that he sees Dr. Gtz a 

neurologist who has tried placing the patient on three different antidepressant 

but failed due side effects and per ex-wife the neurologist is holding off 

placing the patient on psychotropic meds for now.  





PAST PSYCHIATRIC HISTORY:


Diagnoses:Dementia


Suicide attempts or Self-harm behavior: Denies


Prior psychiatric hospitalizations:Denies


Substance Abuse history:Denies


Outpatient treatment: Unknown





PAST MEDICAL HISTORY: unknown


Family Psychiatric History: None reported or documented





SOCIAL HISTORY


Marital Status:


Living Arrangements: lives with children


Employment Status:Retired


Access to guns/weapons: Denies


Education:12th


History of Abuse:Denies


Legal History: Denies





REVIEW OF SYSTEMS  


Constitutional: Negative for weight loss


ENT: Negative for stridor


Respiratory: Negative for cough or hemoptysis


All other systems reviewed and are negative





MENTAL STATUS EXAMINATION


General Appearance and Behavior: Age appropriate, good hygiene, wearing 

appropriate clothes. calm, cooperative


Cooperation: Cooperative


Psychomotor Behavior: Psychomotor normal


Mood:calm


Affect and affective range: congruent with stated mood


Thought Process: confused


Thought Content:  


Speech: normal tone and pace


Suicidal Ideation: Denies


Homicidal Ideation: Denies


Hallucinations: Denies


Delusions:Denies


Impulse Control: Limited


Insight and Judgment: Limited insight and fair judgment


Memory: Limited


Attention: distracted


Orientation: a/o x 1





 Assessment 


(1)Hx of Dementia


(2)


Current Visit: Yes   Status: Acute


Treatment plan


Continue previous prescribed meds





Risks, benefits and alternatives of medications discussed with the patient, 

questions answered and consent obtained from patient.


PSYCHOTHERAPY: Supportive psychotherapy provided


MEDICAL: Per primary team


DELIRIUM PRECAUTIONS: Please re-orient patient frequently, keep lights on during

the day, and minimize benzodiazepines and opiates as these medications could 

worsen patient's confusion.


SAFETY SITTER: Per medical team


DISPOSITION:  Do not Recommend acute inpatient psychiatric hospitalization. 


Will sign off. 


Thank you for the consult.  Please contact with any questions and/or concerns.


Case staffed with Dr. Davidson








Medications and Allergies





Medications and Allergies


                                    Allergies











Allergy/AdvReac Type Severity Reaction Status Date / Time


 


No Known Allergies Allergy   Verified 01/21/22 22:11











                                Home Medications











 Medication  Instructions  Recorded  Confirmed  Last Taken  Type


 


AtorvaSTATin 40 mg PO HS 10/30/21 10/30/21 Unknown History


 


Lisinopril 20 mg PO DAILY 10/30/21 10/30/21 Unknown History


 


QUEtiapine 25 mg PO HS 10/30/21 10/30/21 Unknown History


 


amLODIPine 10 mg PO DAILY 10/30/21 10/30/21 Unknown History


 


metFORMIN 500 mg PO BID 10/30/21 10/30/21 Unknown History











Active Meds: 


Active Medications





Acetaminophen (Acetaminophen 325 Mg Tab)  650 mg PO Q4H PRN


   PRN Reason: Pain MILD(1-3)/Fever >100.5/HA


Acetaminophen (Acetaminophen 325 Mg Tab)  650 mg PO Q6H PRN


   PRN Reason: Pain, Mild (1-3)


Aspirin (Aspirin Ec 325 Mg Tab)  325 mg PO QDAY [facility]


   Last Admin: 01/22/22 11:00 Dose:  325 mg


   


Dextrose (Dextrose 50% In Water (25gm) 50 Ml Syringe)  50 ml IV Q30MIN PRN; 

Protocol


   PRN Reason: Hypoglycemia


Dextrose (Dextrose 50% In Water (25gm) 50 Ml Syringe)  50 ml IV Q30MIN PRN; 

Protocol


   PRN Reason: Hypoglycemia


Insulin Human Lispro (Insulin Lispro 100 Unit/Ml)  0 unit SUB-Q ACHS [facility]; 

Protocol


   Last Admin: 01/22/22 11:52 Dose:  2 unit


   


Magnesium Hydroxide (Magnesium Hydroxide (Mom) Oral Liqd Udc)  30 ml PO Q4H PRN


   PRN Reason: Constipation


Morphine Sulfate (Morphine 2 Mg/1 Ml Inj)  2 mg IV Q4H PRN


   PRN Reason: Pain, Moderate (4-6)


Morphine Sulfate (Morphine 4 Mg/1 Ml Inj)  4 mg IV Q4H PRN


   PRN Reason: Pain , Severe (7-10)


Morphine Sulfate (Morphine 4 Mg/1 Ml Inj)  2 mg IV Q5MIN PRN


   PRN Reason: Chest Pain unrelieved by NTG


Nitroglycerin (Nitroglycerin 0.4 Mg Tab Subl)  0.4 mg SL .Q5MIN PRN


   PRN Reason: Chest Pain


Nitroglycerin (Nitroglycerin 0.4 Mg Tab Subl)  0.4 mg SL Q5M PRN


   PRN Reason: Chest Pain


Ondansetron HCl (Ondansetron 4 Mg/2 Ml Inj)  4 mg IV Q8H PRN


   PRN Reason: Nausea And Vomiting


Sodium Chloride (Sodium Chloride 0.9% 10 Ml Flush Syringe)  10 ml IV BID [facility]


   Last Admin: 01/22/22 11:00 Dose:  10 ml


   


Sodium Chloride (Sodium Chloride 0.9% 10 Ml Flush Syringe)  10 ml IV PRN PRN


   PRN Reason: LINE FLUSH


Sodium Chloride (Sodium Chloride 0.9% 10 Ml Flush Syringe)  10 ml IV PRN PRN


   PRN Reason: LINE FLUSH


Tramadol HCl (Tramadol 50 Mg Tab)  50 mg PO Q6H PRN


   PRN Reason: Pain, Moderate (4-6)











Mental Status Exam





- Vital signs


                                Last Vital Signs











Temp  98.8 F   01/22/22 08:10


 


Pulse  72   01/22/22 08:10


 


Resp  18   01/22/22 08:10


 


BP  142/73   01/22/22 08:10


 


Pulse Ox  98   01/22/22 09:08














Results


Result Diagrams: 


                                 01/22/22 05:50





                                 01/22/22 05:50


                              Abnormal lab results











  01/21/22 01/21/22 01/21/22 Range/Units





  20:00 20:00 20:00 


 


RBC  5.07 H    (3.65-5.03)  M/mm3


 


Mono % (Auto)  11.1 H    (0.0-7.3)  %


 


Glucose   204 H   ()  mg/dL


 


POC Glucose     ()  mg/dL


 


Salicylates    < 0.3 L  (2.8-20.0)  mg/dL


 


Acetaminophen     (10.0-30.0)  ug/mL














  01/21/22 01/21/22 01/21/22 Range/Units





  20:00 23:08 23:08 


 


RBC     (3.65-5.03)  M/mm3


 


Mono % (Auto)   10.1 H   (0.0-7.3)  %


 


Glucose    246 H  ()  mg/dL


 


POC Glucose     ()  mg/dL


 


Salicylates     (2.8-20.0)  mg/dL


 


Acetaminophen  5.0 L    (10.0-30.0)  ug/mL














  01/22/22 01/22/22 01/22/22 Range/Units





  05:50 05:50 08:16 


 


RBC     (3.65-5.03)  M/mm3


 


Mono % (Auto)  11.9 H    (0.0-7.3)  %


 


Glucose   267 H   ()  mg/dL


 


POC Glucose    199 H  ()  mg/dL


 


Salicylates     (2.8-20.0)  mg/dL


 


Acetaminophen     (10.0-30.0)  ug/mL














  01/22/22 Range/Units





  11:16 


 


RBC   (3.65-5.03)  M/mm3


 


Mono % (Auto)   (0.0-7.3)  %


 


Glucose   ()  mg/dL


 


POC Glucose  155 H  ()  mg/dL


 


Salicylates   (2.8-20.0)  mg/dL


 


Acetaminophen   (10.0-30.0)  ug/mL








All other labs normal.

## 2022-01-22 NOTE — PROGRESS NOTE
Assessment and Plan


66-year-old male with known history of diabetes mellitus, 

dyslipidemia,hypertension, dementia brought into the emergency room by EMS 

because of declining cognitive status and paranoia.  Upon arrival in the 

emergency room, patient was confused and was complaining of chest pressure and 

near syncope.


Wife has the power of  and she is also medical decision-maker.  There 

had been discussion in the past about placement in a nursing home or personal 

care home.  Patient has home health care twice a week.


Work-up in the emergency room : chest x-ray shows no acute abnormality.  CT scan

of the head shows no acute abnormality.


Labs were also unremarkable. Patient has been admitted for chest pain and 

changes in mental status.





Assessment and plan:


-- Chest pain


Current Visit: Yes   Status: Acute   


Plan to address problem: 


Monitor with serial cardiac enzymes.


Patient placed on daily aspirin,Sublingual nitroglycerin and IV morphine as 

needed for chest pain.








-- Behavior concern/acute psychosis


Current Visit: Yes   Status: Acute   


Plan to address problem: 


Possibly related to the underlying dementia.


Consult placed to mental health for evaluation.


Consulted case management for possible placement.








-- Diabetes mellitus


Current Visit: Yes   Status: Acute   


Plan to address problem: 


Will monitor Accu-Cheks closely.








-- Hypertension


Current Visit: Yes   Status: Acute   


Plan to address problem: 


Resume routine home medications and monitor vital signs closely.








-- DVT prophylaxis


Current Visit: Yes   Status: Acute   


Plan to address problem: 


Patient placed on subcutaneous heparin.








-- Full code status


Current Visit: Yes   Status: Acute   


Plan to address problem: 


Patient is full code.





Daily clinical course:


1/22: Patient remains agitated and confused.  But denies any chest pain right 

now.  Wait for psych and cardiology recommendion, continue to follow clinically.





Subjective


Date of service: 01/22/22


Interval history: 





Patient seen and examined.  Medical records and medication list reviewed.  


No acute event overnight noted by the RN.  


Patient appears to be agitated and confused.  Patient is tolerating diet.  


Discussed plan of care at bedside with patient.








Objective





- Constitutional


Vitals: 


                               Vital Signs - 12hr











  01/22/22 01/22/22 01/22/22





  04:18 08:10 09:08


 


Temperature 98.5 F 98.8 F 


 


Pulse Rate 82 72 


 


Respiratory 18 18 





Rate   


 


Blood Pressure 121/65 142/73 


 


O2 Sat by Pulse 97 97 98





Oximetry   














  01/22/22





  12:00


 


Temperature 98.4 F


 


Pulse Rate 80


 


Respiratory 18





Rate 


 


Blood Pressure 171/95


 


O2 Sat by Pulse 98





Oximetry 














- Labs


CBC & Chem 7: 


                                 01/22/22 05:50





                                 01/22/22 05:50


Labs: 


                              Abnormal lab results











  01/21/22 01/21/22 01/21/22 Range/Units





  20:00 20:00 20:00 


 


RBC  5.07 H    (3.65-5.03)  M/mm3


 


Mono % (Auto)  11.1 H    (0.0-7.3)  %


 


Glucose   204 H   ()  mg/dL


 


POC Glucose     ()  mg/dL


 


Salicylates    < 0.3 L  (2.8-20.0)  mg/dL


 


Acetaminophen     (10.0-30.0)  ug/mL














  01/21/22 01/21/22 01/21/22 Range/Units





  20:00 23:08 23:08 


 


RBC     (3.65-5.03)  M/mm3


 


Mono % (Auto)   10.1 H   (0.0-7.3)  %


 


Glucose    246 H  ()  mg/dL


 


POC Glucose     ()  mg/dL


 


Salicylates     (2.8-20.0)  mg/dL


 


Acetaminophen  5.0 L    (10.0-30.0)  ug/mL














  01/22/22 01/22/22 01/22/22 Range/Units





  05:50 05:50 08:16 


 


RBC     (3.65-5.03)  M/mm3


 


Mono % (Auto)  11.9 H    (0.0-7.3)  %


 


Glucose   267 H   ()  mg/dL


 


POC Glucose    199 H  ()  mg/dL


 


Salicylates     (2.8-20.0)  mg/dL


 


Acetaminophen     (10.0-30.0)  ug/mL














  01/22/22 Range/Units





  11:16 


 


RBC   (3.65-5.03)  M/mm3


 


Mono % (Auto)   (0.0-7.3)  %


 


Glucose   ()  mg/dL


 


POC Glucose  155 H  ()  mg/dL


 


Salicylates   (2.8-20.0)  mg/dL


 


Acetaminophen   (10.0-30.0)  ug/mL














HEART Score





- HEART Score


EKG: Non-specific


Age: > 65


Risk factors: > 3 risk factors or hx of atherosclerotic disease


Troponin: 


                                        











Troponin T  < 0.010 ng/mL (0.00-0.029)   01/22/22  05:50    











Troponin: < normal limit





- Critical Actions


Critical Actions: 4-6 pts:12-16.6% risk of adverse cardiac event. Should be 

admitted

## 2022-01-23 VITALS — DIASTOLIC BLOOD PRESSURE: 99 MMHG | SYSTOLIC BLOOD PRESSURE: 172 MMHG

## 2022-01-23 RX ADMIN — INSULIN LISPRO SCH UNIT: 100 INJECTION, SOLUTION INTRAVENOUS; SUBCUTANEOUS at 08:16

## 2022-01-23 NOTE — DISCHARGE SUMMARY
Providers





- Providers


Date of Admission: 


01/21/22 22:13





Date of discharge: 01/23/22


Attending physician: 


ALYSON STAPLES





                                        





01/21/22


Consult to Cardiac Rehabilitation [CONS] Routine 


   Reason For Exam: Phase I





01/21/22 22:13


Consult to Cardiology [CONS] Routine 


   Consulting Provider: AURORA JUAN


   Reason For Exam: chest pain


Consult to Dietitian/Nutrition [CONS] Routine 


   Physician Instructions: 


   Reason For Exam: 


   Reason for Consult: Diet education





01/22/22 05:59


Consult to Case Management [CONS] Routine 


   Services Needed at Discharge: Other


   Notified:: case management


   Comment:: For nursing home placement





01/22/22 06:00


Consult to Mental Health [CONS] Routine 


   Reason For Exam: Dementia with behavioral disorder











Primary care physician: 


PRIMARY CARE MD








Hospitalization


Condition: Good


Pertinent studies: 





Chest x-ray, head CT, 2D echo





Hospital course: 





66-year-old male with known history of diabetes mellitus, 

dyslipidemia,hypertension, dementia brought into the emergency room by EMS 

because of declining cognitive status and paranoia.  Upon arrival in the 

emergency room, patient was confused and was complaining of chest pressure and 

near syncope.


Wife has the power of  and she is also medical decision-maker.  There 

had been discussion in the past about placement in a nursing home or personal 

care home.  Patient has home health care twice a week.


Work-up in the emergency room : chest x-ray shows no acute abnormality.  CT scan

 of the head shows no acute abnormality.


Labs were also unremarkable. Patient has been admitted for chest pain and 

changes in mental status.


Patient was monitored with the serial cardiac enzymes which has been negative


Psych was consulted and recommended no inpatient psych hospitalization is 

necessary.


Patient was being monitored with supportive care, cardiac evaluation is pending.

  Patient was more alert and oriented by next day.


But patient refused to stay in the hospital and wanted to leave AGAINST MEDICAL 

ADVICE and was refusing all treatment in the hospital.


Discussed with patient and wife and inform her that the patient wanted to leave 

against medical advised.  Patient wife who is the POA agreed with patient and 

patient was discharged with his wife.  Advised to follow-up with Dr. Mackey in 1 

week. His 2D echo showed preserved EF.











Disposition: 07 LEFT AGAINST MEDICAL ADVICE


Final Discharge Diagnosis (Prints w/discharge instructions): -- Chest pain, 

atypical, likely musculoskeletal.  -- Behavior concern/acute psychosis.  

--Dementia.  -- Diabetes mellitus.  -- Hypertension


Time spent for discharge: 34 minutes





Core Measure Documentation





- Palliative Care


Palliative Care/ Comfort Measures: Not Applicable





- Core Measures


Any of the following diagnoses?: none





Exam





- Physical Exam


Narrative exam: 





Vitals noted and stable





- Constitutional


Vitals: 


                                        











Temp Pulse Resp BP Pulse Ox


 


 98.1 F   93 H  19   145/80   98 


 


 01/23/22 00:00  01/23/22 02:00  01/23/22 00:00  01/23/22 00:00  01/23/22 02:00














Plan


Activity: advance as tolerated


Weight Bearing Status: Non-Weight Bearing


Diet: low fat, low salt


Follow up with: 


PRIMARY CARE,MD [Primary Care Provider] - 7 Days


SUHAS MACKEY MD [Staff Physician] - 7 Days


Forms:  AMA Form


Prescriptions: 


Aspirin EC [Halfprin EC] 81 mg PO QDAY #30 tablet.

## 2022-01-23 NOTE — CONSULTATION
DATE OF CONSULTATION: 01/22/2022



HISTORY OF PRESENT ILLNESS:  The patient is a 66-year-old -American 

gentleman with multiple medical problems who was brought to the hospital because

of declining cognitive status.  In the emergency room, he complained of chest 

pressure and near syncope.  At this time, he is confused, comfortable, with no 

complaints.  He denies all cardiovascular symptoms such as chest pain, shortness

of breath, palpitations, dizziness, claudication or edema.  He describes himself

as moderately active and eating fairly well.  There has been no insomnia.  There

is a remote history of smoking, without a history of lung disease.  He can tell 

you that he has dementia.  Cardiovascular workup has been initiated.



PAST MEDICAL HISTORY:

ALLERGIES:  None.



SOCIAL HISTORY:  Smoking, prior smoker.  Alcohol, no heavy use described.



FAMILY HISTORY:  Unremarkable.



PREVIOUS OPERATIONS:.  Exploratory lap.



MEDICATIONS:  See the nurse's list.



REVIEW OF SYSTEMS:  No other complaints or medical problems described.



PHYSICAL EXAMINATION:

GENERAL:  Well-developed, well-nourished, moderately overweight, no acute 

distress.  Alert, cooperative, but confused.

EYES, NOSE AND THROAT:  Unremarkable.

NECK:  Reveals mild JVD.  There are no bruits.

NECK:  Supple, no masses.

LUNGS:  Clear.  No labored respirations.

HEART:  Regular rhythm.  Soft S4.  No murmurs or rubs.

ABDOMEN:  Soft, nontender, no masses.

EXTREMITIES:  No cyanosis, clubbing, edema.  Peripheral pulses are intact.

NEUROLOGIC:  Deferred.

SKIN:  Clear.



LABORATORY DATA:  EKG, no acute electrocardiographic changes reported.  Chest 

x-ray, no acute disease evident.



IMPRESSION:

1.  History of chest pain with many risk factors for coronary artery disease.  

There is no suggestion of an acute coronary syndrome, so stress test and echo 

has been initiated for an evaluation.

2.  Diabetes.

3.  Obesity.

4.  Dementia.

5.  Hypertension.

6.  Hyperlipidemia.

7.  Prior smoker.



PLAN:  Medical therapy for possible underlying coronary artery disease along 

with therapy for his other risk factors.  Workup is in progress.



Thank you for this consultation.







DD: 01/22/2022 11:32 AM

DT: 01/22/2022 06:39 PM

TID: [id number] RECEIPT: [id number]

ELIO/HIM

## 2022-02-24 ENCOUNTER — HOSPITAL ENCOUNTER (EMERGENCY)
Dept: HOSPITAL 5 - ED | Age: 67
Discharge: HOME | End: 2022-02-24
Payer: MEDICARE

## 2022-02-24 VITALS — DIASTOLIC BLOOD PRESSURE: 87 MMHG | SYSTOLIC BLOOD PRESSURE: 132 MMHG

## 2022-02-24 DIAGNOSIS — Z87.891: ICD-10-CM

## 2022-02-24 DIAGNOSIS — R07.9: Primary | ICD-10-CM

## 2022-02-24 DIAGNOSIS — I10: ICD-10-CM

## 2022-02-24 LAB
ALBUMIN SERPL-MCNC: 4.4 G/DL (ref 3.9–5)
ALT SERPL-CCNC: 14 UNITS/L (ref 7–56)
APTT BLD: 28.1 SEC. (ref 24.2–36.6)
BASOPHILS # (AUTO): 0.1 K/MM3 (ref 0–0.1)
BASOPHILS NFR BLD AUTO: 1 % (ref 0–1.8)
BILIRUB DIRECT SERPL-MCNC: < 0.2 MG/DL (ref 0–0.2)
BUN SERPL-MCNC: 11 MG/DL (ref 9–20)
BUN/CREAT SERPL: 11 %
CALCIUM SERPL-MCNC: 10.2 MG/DL (ref 8.4–10.2)
EOSINOPHIL # BLD AUTO: 0.2 K/MM3 (ref 0–0.4)
EOSINOPHIL NFR BLD AUTO: 2.7 % (ref 0–4.3)
HCT VFR BLD CALC: 40.3 % (ref 35.5–45.6)
HEMOLYSIS INDEX: 11
HGB BLD-MCNC: 13.7 GM/DL (ref 11.8–15.2)
INR PPP: 1.02 (ref 0.87–1.13)
LYMPHOCYTES # BLD AUTO: 1.4 K/MM3 (ref 1.2–5.4)
LYMPHOCYTES NFR BLD AUTO: 23.8 % (ref 13.4–35)
MCHC RBC AUTO-ENTMCNC: 34 % (ref 32–34)
MCV RBC AUTO: 86 FL (ref 84–94)
MONOCYTES # (AUTO): 0.7 K/MM3 (ref 0–0.8)
MONOCYTES % (AUTO): 11.6 % (ref 0–7.3)
PLATELET # BLD: 276 K/MM3 (ref 140–440)
RBC # BLD AUTO: 4.67 M/MM3 (ref 3.65–5.03)

## 2022-02-24 PROCEDURE — 80076 HEPATIC FUNCTION PANEL: CPT

## 2022-02-24 PROCEDURE — 93010 ELECTROCARDIOGRAM REPORT: CPT

## 2022-02-24 PROCEDURE — 83880 ASSAY OF NATRIURETIC PEPTIDE: CPT

## 2022-02-24 PROCEDURE — 71275 CT ANGIOGRAPHY CHEST: CPT

## 2022-02-24 PROCEDURE — 71045 X-RAY EXAM CHEST 1 VIEW: CPT

## 2022-02-24 PROCEDURE — 80048 BASIC METABOLIC PNL TOTAL CA: CPT

## 2022-02-24 PROCEDURE — 85730 THROMBOPLASTIN TIME PARTIAL: CPT

## 2022-02-24 PROCEDURE — 83690 ASSAY OF LIPASE: CPT

## 2022-02-24 PROCEDURE — 93005 ELECTROCARDIOGRAM TRACING: CPT

## 2022-02-24 PROCEDURE — 85610 PROTHROMBIN TIME: CPT

## 2022-02-24 PROCEDURE — 99284 EMERGENCY DEPT VISIT MOD MDM: CPT

## 2022-02-24 PROCEDURE — 84484 ASSAY OF TROPONIN QUANT: CPT

## 2022-02-24 PROCEDURE — 85025 COMPLETE CBC W/AUTO DIFF WBC: CPT

## 2022-02-24 PROCEDURE — 36415 COLL VENOUS BLD VENIPUNCTURE: CPT

## 2022-02-24 PROCEDURE — 85379 FIBRIN DEGRADATION QUANT: CPT

## 2022-02-24 NOTE — CAT SCAN REPORT
CTA CHEST WITH CONTRAST



INDICATION / CLINICAL INFORMATION: CHEST PAIN WITH ELEVATED D-DIMER  100 ML OMNI 350 .



TECHNIQUE: Axial CT images were obtained through the chest after injection of 100 cc Omnipaque 350 IV
 contrast. 3 plane MIP and/or 3D reconstructions were produced. All CT scans at this location are per
formed using CT dose reduction for ALARA by means of automated exposure control. 



COMPARISON: CTA chest 7/20/2021



FINDINGS:

VASCULAR FINDINGS:

PULMONARY ARTERY: Moderate motion artifact is present. Additional moderate streak artifact present se
condary to column of contrast within the superior vena cava. Pulmonary artery is normal in size. No f
illing defects are present compatible with pulmonary artery embolus. At the bifurcation of the right 
middle lobe medial segmental artery branch, linear hypoattenuation is demonstrated which on sagittal 
MPR series appears to represent artifact. 

THORACIC AORTA: No significant abnormality. 

CORONARY ARTERY CALCIFICATION: Present -- Moderate.



NONVASCULAR FINDINGS:

LOWER NECK:Soft tissues of the lower neck and thyroid demonstrate no significant abnormalities or acu
te findings.

HEART: Stable mild cardiomegaly.

MEDIASTINUM / ANDREY: No significant abnormality.

ESOPHAGUS: No significant abnormality.

LYMPH NODES: No adenopathy within the axilla, mediastinum, or andrey.

LUNGS: Minimal apical paraseptal changes. Moderate motion artifact. Dependent atelectatic changes.

PLEURA: No pleural effusion. No pneumothorax.

THORACIC SOFT TISSUES: No significant abnormality of the chest wall or upper thoracic musculature. St
aples Fallot, right shoulder.

BONES: No significant skeletal abnormalities.

ADDITIONAL CHEST FINDINGS: None.

UPPER ABDOMEN: No significant abnormality.





IMPRESSION:

1. No CT evidence for pulmonary embolism. 

2. No acute findings.



Signer Name: Gilberto Norton II, MD 

Signed: 2/24/2022 5:20 PM

Workstation Name: [id number]

## 2022-02-24 NOTE — XRAY REPORT
CHEST 1 VIEW 2/24/2022 2:02 PM



INDICATION / CLINICAL INFORMATION: Chest Pain.



COMPARISON: 1/21/2022



FINDINGS:



SUPPORT DEVICES: None.

HEART / MEDIASTINUM: No significant abnormality. 

LUNGS / PLEURA: Slight increased opacities in the right lung base. No pneumothorax. 



ADDITIONAL FINDINGS: No significant additional findings.



IMPRESSION:

1. Increased opacities in the right lung base which may represent atelectasis; however, infectious pr
ocess should be considered in the appropriate clinical setting.



Signer Name: Chaz Rodríguez DO 

Signed: 2/24/2022 2:20 PM

Workstation Name: [email or web address]

## 2022-02-24 NOTE — EMERGENCY DEPARTMENT REPORT
ED Chest Pain HPI





- General


Chief Complaint: Chest Pain


Stated Complaint: chest pain


Time Seen by Provider: 02/24/22 13:55


Source: patient


Mode of arrival: Ambulatory


Limitations: No Limitations





- History of Present Illness


Initial Comments: 





Patient is 66-year-old male with history of hypertension, diabetes and dementia.

 Patient brought to the emergency room via EMS from home for evaluation of left-

sided chest pain patient stated that the pain has been going on for a few days 

but got worse this morning.  Patient describes his pain as tightness and sharp 

sometimes.  Patient is complaining of shortness of breath also.  He denies any 

fever or chills.


MD Complaint: chest pain


-: days(s)


Onset: during rest


Pain Location: left chest


Pain Radiation: none


Severity: moderate


Quality: tightness, sharp


Consistency: intermittent





- Related Data


                                Home Medications











 Medication  Instructions  Recorded  Confirmed  Last Taken


 


AtorvaSTATin 40 mg PO HS 10/30/21 01/22/22 Unknown


 


Lisinopril 20 mg PO DAILY 10/30/21 01/22/22 Unknown


 


QUEtiapine 25 mg PO HS 10/30/21 10/30/21 Unknown


 


amLODIPine 10 mg PO DAILY 10/30/21 01/22/22 Unknown


 


metFORMIN 500 mg PO BID 10/30/21 01/22/22 Unknown








                                  Previous Rx's











 Medication  Instructions  Recorded  Last Taken  Type


 


Aspirin EC [Halfprin EC] 81 mg PO QDAY #30 tablet. 01/23/22 Unknown Rx











                                    Allergies











Allergy/AdvReac Type Severity Reaction Status Date / Time


 


No Known Allergies Allergy   Verified 02/24/22 14:04














Heart Score





- HEART Score


History: Moderately suspicious


EKG: Non-specific


Age: > 65


Risk factors: > 3 risk factors or hx of atherosclerotic disease


Troponin: < normal limit


HEART Score: 6





- EKG Read Time


Time EKG Completed: 13:48


EKG Read Time: 13:50





- Critical Actions


Critical Actions: 4-6 pts:12-16.6% risk of adverse cardiac event. Should be 

admitted





ED Review of Systems


ROS: 


Stated complaint: chest pain


Other details as noted in HPI





Comment: All other systems reviewed and negative


Constitutional: denies: chills, fever


Respiratory: shortness of breath.  denies: cough, orthopnea, SOB with exertion


Cardiovascular: chest pain.  denies: palpitations


Gastrointestinal: denies: abdominal pain, nausea, vomiting


Musculoskeletal: denies: back pain


Neurological: denies: headache, weakness, numbness, paresthesias, confusion





ED Past Medical Hx





- Past Medical History


Hx Hypertension: Yes


Hx Congestive Heart Failure: No


Hx Diabetes: Yes


Hx Asthma: No


Hx COPD: No


Hx Dementia: Yes


Additional medical history: dementia, vertigo





- Surgical History


Additional Surgical History: Large ex lap wound present but patient is unaware 

of what surgery was performed and for what reason





- Social History


Smoking Status: Former Smoker (None x10-15 years)


Substance Use Type: None (Denies illicit drug use)





- Medications


Home Medications: 


                                Home Medications











 Medication  Instructions  Recorded  Confirmed  Last Taken  Type


 


AtorvaSTATin 40 mg PO HS 10/30/21 01/22/22 Unknown History


 


Lisinopril 20 mg PO DAILY 10/30/21 01/22/22 Unknown History


 


QUEtiapine 25 mg PO HS 10/30/21 10/30/21 Unknown History


 


amLODIPine 10 mg PO DAILY 10/30/21 01/22/22 Unknown History


 


metFORMIN 500 mg PO BID 10/30/21 01/22/22 Unknown History


 


Aspirin EC [Halfprin EC] 81 mg PO QDAY #30 tablet. 01/23/22  Unknown Rx














ED Physical Exam





- General


Limitations: No Limitations


General appearance: alert, in no apparent distress





- Head


Head exam: Present: atraumatic, normocephalic, normal inspection





- Eye


Eye exam: Present: normal appearance





- ENT


ENT exam: Present: normal exam, normal orophraynx, mucous membranes moist





- Neck


Neck exam: Present: normal inspection, full ROM.  Absent: tenderness, 

meningismus





- Respiratory


Respiratory exam: Present: normal lung sounds bilaterally





- Cardiovascular


Cardiovascular Exam: Present: regular rate, normal rhythm, normal heart sounds





- GI/Abdominal


GI/Abdominal exam: Present: soft, normal bowel sounds.  Absent: distended, 

tenderness, guarding, rebound, rigid, organomegaly, mass, bruit, pulsatile mass,

hernia





- Extremities Exam


Extremities exam: Present: normal inspection, full ROM, normal capillary refill.

 Absent: tenderness





- Back Exam


Back exam: Present: normal inspection, full ROM.  Absent: CVA tenderness (R), 

CVA tenderness (L)





- Neurological Exam


Neurological exam: Present: alert, oriented X3, CN II-XII intact, reflexes norm

al.  Absent: motor sensory deficit





- Psychiatric


Psychiatric exam: Present: normal mood





- Skin


Skin exam: Present: warm, intact, normal color





ED Course


                                   Vital Signs











  02/24/22 02/24/22 02/24/22





  13:30 13:50 14:00


 


Temperature 98.2 F  


 


Pulse Rate 101 H  


 


Respiratory 16 21 





Rate   


 


Blood Pressure   


 


Blood Pressure 164/102  





[Left]   


 


O2 Sat by Pulse 96  96





Oximetry   














  02/24/22 02/24/22 02/24/22





  14:01 14:15 14:31


 


Temperature   


 


Pulse Rate 82 84 90


 


Respiratory 22 18 21





Rate   


 


Blood Pressure 159/91 163/93 157/88


 


Blood Pressure   





[Left]   


 


O2 Sat by Pulse 98 96 96





Oximetry   














  02/24/22 02/24/22 02/24/22





  14:45 15:01 15:15


 


Temperature   


 


Pulse Rate 85 80 89


 


Respiratory 17 17 16





Rate   


 


Blood Pressure 153/88 143/82 148/84


 


Blood Pressure   





[Left]   


 


O2 Sat by Pulse 95 95 96





Oximetry   














  02/24/22 02/24/22





  15:31 15:45


 


Temperature  


 


Pulse Rate 95 H 83


 


Respiratory 18 19





Rate  


 


Blood Pressure 152/91 143/83


 


Blood Pressure  





[Left]  


 


O2 Sat by Pulse 95 96





Oximetry  














ED Medical Decision Making





- Lab Data


Result diagrams: 


                                 02/24/22 14:35





                                 02/24/22 14:35





- EKG Data


-: EKG Interpreted by Me


EKG shows normal: sinus rhythm


Rate: normal





- EKG Data


Interpretation: no acute changes





- Radiology Data


Radiology results: report reviewed





- Medical Decision Making





Patient is 66-year-old male with history of hypertension, diabetes and dementia.

  Patient brought to the emergency room via EMS from home for evaluation of 

left-sided chest pain patient stated that the pain has been going on for a few 

days but got worse this morning.  Patient describes his pain as tightness and 

sharp sometimes.  Patient is complaining of shortness of breath also.  He denies

 any fever or chills.





Patient remained stable in the ER with a stable vital sign.  EKG showed no ST 

elevation.  Labs reviewed and is unremarkable including a negative troponin x2. 

 D-dimer slightly elevated and patient had a CTA done which is negative for PE 

or any other pathology.  Patient daughter informed the nurse that he has been 

having this emotional outbreak secondary to his dementia.  She stated that this 

morning he thought that he is by himself at home while his daughter in the 

bathroom and he started crying and he called 911.


Patient advised to follow-up with his cardiologist in the next 2 to 3 days and 

his primary care physician in the next 2 to 3 days and to return to the ER if he

 develop any new symptoms.





Critical care attestation.: 


If time is entered above; I have spent that time in minutes in the direct care 

of this critically ill patient, excluding procedure time.








ED Disposition


Clinical Impression: 


 Acute chest pain





Disposition: 01 HOME / SELF CARE / HOMELESS


Is pt being admited?: No


Condition: Stable


Instructions:  Chest Pain (ED), Nonspecific Chest Pain, Adult


Referrals: 


PRIMARY CARE,MD [Referring] - 3-5 Days

## 2022-02-25 NOTE — ELECTROCARDIOGRAPH REPORT
[facility]

                                       

Test Date:    2022               Test Time:    13:48:04

Pat Name:     LANCE DERAS            Department:   

Patient ID:   [id number]          Room:          

Gender:       M                        Technician:   FEROZ

:          1955               Requested By: VEE PETTIT

Order Number: [id number]              Reading MD:   Parker Roche

                                 Measurements

Intervals                              Axis          

Rate:         80                       P:            34

ND:           158                      QRS:          -27

QRSD:         86                       T:            -2

QT:           372                                    

QTc:          428                                    

                           Interpretive Statements

Sinus rhythm

Ventricular premature complex

Probable left atrial enlargement

Inferior infarct, old

Compared to ECG 2022 08:02:08

Ventricular premature complex(es) now present

Myocardial infarct finding still present

Electronically Signed On 2022 9:59:41 EST by Parker Roche

## 2022-04-05 ENCOUNTER — HOSPITAL ENCOUNTER (EMERGENCY)
Dept: HOSPITAL 5 - ED | Age: 67
Discharge: LEFT BEFORE DISCHARGE | End: 2022-04-05
Payer: MEDICARE

## 2022-04-05 VITALS — SYSTOLIC BLOOD PRESSURE: 150 MMHG | DIASTOLIC BLOOD PRESSURE: 92 MMHG

## 2022-04-05 DIAGNOSIS — R51.9: Primary | ICD-10-CM

## 2022-04-05 DIAGNOSIS — Z53.21: ICD-10-CM

## 2022-05-31 ENCOUNTER — HOSPITAL ENCOUNTER (EMERGENCY)
Dept: HOSPITAL 5 - ED | Age: 67
Discharge: HOME | End: 2022-05-31
Payer: MEDICARE

## 2022-05-31 VITALS — DIASTOLIC BLOOD PRESSURE: 90 MMHG | SYSTOLIC BLOOD PRESSURE: 166 MMHG

## 2022-05-31 DIAGNOSIS — I10: ICD-10-CM

## 2022-05-31 DIAGNOSIS — Z87.891: ICD-10-CM

## 2022-05-31 DIAGNOSIS — J45.909: ICD-10-CM

## 2022-05-31 DIAGNOSIS — E11.9: ICD-10-CM

## 2022-05-31 DIAGNOSIS — H61.21: Primary | ICD-10-CM

## 2022-05-31 PROCEDURE — 99283 EMERGENCY DEPT VISIT LOW MDM: CPT

## 2022-05-31 NOTE — EMERGENCY DEPARTMENT REPORT
ED ENT HPI





- General


Chief complaint: Earache


Stated complaint: CLOGGED EARS


Time Seen by Provider: 05/31/22 10:25


Source: patient, EMS


Mode of arrival: Ambulatory


Limitations: No Limitations





- History of Present Illness


Initial comments: 


67-year-old male presents to the ED with no complaints.  Patient was brought by 

ambulance home states that family member stated that the patient had bilateral 

ear pain.  Patient has a history of dementia.  he is alert and oriented to  

place and person.  Patient denies any ear pain at present.  Denies any headache,

pain shortness of breath.  Denies any complaint at present time.  No acute 

distress noted.  No ill appearance noted





- Related Data


                                Home Medications











 Medication  Instructions  Recorded  Confirmed  Last Taken


 


AtorvaSTATin 40 mg PO HS 10/30/21 01/22/22 Unknown


 


Lisinopril 20 mg PO DAILY 10/30/21 01/22/22 Unknown


 


QUEtiapine 25 mg PO HS 10/30/21 10/30/21 Unknown


 


amLODIPine 10 mg PO DAILY 10/30/21 01/22/22 Unknown


 


metFORMIN 500 mg PO BID 10/30/21 01/22/22 Unknown








                                  Previous Rx's











 Medication  Instructions  Recorded  Last Taken  Type


 


Aspirin EC [Halfprin EC] 81 mg PO QDAY #30 tablet. 01/23/22 Unknown Rx











                                    Allergies











Allergy/AdvReac Type Severity Reaction Status Date / Time


 


No Known Allergies Allergy   Verified 02/24/22 14:04














ED Dental HPI





- General


Chief complaint: Earache


Stated complaint: CLOGGED EARS


Time Seen by Provider: 05/31/22 10:25


Source: patient, EMS


Mode of arrival: Ambulatory


Limitations: No Limitations





- Related Data


                                Home Medications











 Medication  Instructions  Recorded  Confirmed  Last Taken


 


AtorvaSTATin 40 mg PO HS 10/30/21 01/22/22 Unknown


 


Lisinopril 20 mg PO DAILY 10/30/21 01/22/22 Unknown


 


QUEtiapine 25 mg PO HS 10/30/21 10/30/21 Unknown


 


amLODIPine 10 mg PO DAILY 10/30/21 01/22/22 Unknown


 


metFORMIN 500 mg PO BID 10/30/21 01/22/22 Unknown








                                  Previous Rx's











 Medication  Instructions  Recorded  Last Taken  Type


 


Aspirin EC [Halfprin EC] 81 mg PO QDAY #30 tablet. 01/23/22 Unknown Rx











                                    Allergies











Allergy/AdvReac Type Severity Reaction Status Date / Time


 


No Known Allergies Allergy   Verified 02/24/22 14:04














ED Review of Systems


ROS: 


Stated complaint: CLOGGED EARS


Other details as noted in HPI





Constitutional: denies: chills, fever


Eyes: denies: eye pain, eye discharge, vision change


ENT: denies: ear pain, throat pain


Respiratory: denies: cough, shortness of breath, wheezing


Cardiovascular: denies: chest pain, palpitations


Endocrine: no symptoms reported


Gastrointestinal: denies: abdominal pain, nausea, diarrhea


Genitourinary: denies: urgency, dysuria


Musculoskeletal: denies: back pain, joint swelling, arthralgia


Skin: denies: rash, lesions


Neurological: denies: headache, weakness, paresthesias


Psychiatric: denies: anxiety, depression


Hematological/Lymphatic: denies: easy bleeding, easy bruising





ED Past Medical Hx





- Past Medical History


Hx Hypertension: Yes


Hx Congestive Heart Failure: No


Hx Diabetes: Yes


Hx Asthma: No


Hx COPD: No


Hx Dementia: Yes


Additional medical history: dementia, vertigo





- Surgical History


Additional Surgical History: Large ex lap wound present but patient is unaware 

of what surgery was performed and for what reason





- Social History


Smoking Status: Former Smoker (None x10-15 years)


Substance Use Type: None (Denies illicit drug use)





- Medications


Home Medications: 


                                Home Medications











 Medication  Instructions  Recorded  Confirmed  Last Taken  Type


 


AtorvaSTATin 40 mg PO HS 10/30/21 01/22/22 Unknown History


 


Lisinopril 20 mg PO DAILY 10/30/21 01/22/22 Unknown History


 


QUEtiapine 25 mg PO HS 10/30/21 10/30/21 Unknown History


 


amLODIPine 10 mg PO DAILY 10/30/21 01/22/22 Unknown History


 


metFORMIN 500 mg PO BID 10/30/21 01/22/22 Unknown History


 


Aspirin EC [Halfprin EC] 81 mg PO QDAY #30 tablet. 01/23/22  Unknown Rx














ED Physical Exam





- General


Limitations: No Limitations


General appearance: alert, in no apparent distress





- Head


Head exam: Present: atraumatic, normocephalic





- Eye


Eye exam: Present: normal appearance





- ENT


ENT exam: Present: mucous membranes moist





- Expanded ENT Exam


  ** Expanded


TM/Canal exam: Cerumen Impaction: Right TM, Left TM





- Neck


Neck exam: Present: normal inspection





- Respiratory


Respiratory exam: Present: normal lung sounds bilaterally.  Absent: respiratory 

distress





- Cardiovascular


Cardiovascular Exam: Present: regular rate, normal rhythm.  Absent: systolic 

murmur, diastolic murmur, rubs, gallop





- GI/Abdominal


GI/Abdominal exam: Present: soft, normal bowel sounds





- Rectal


Rectal exam: Present: deferred





- Extremities Exam


Extremities exam: Present: normal inspection





- Back Exam


Back exam: Present: normal inspection





- Neurological Exam


Neurological exam: Present: alert, oriented X3





- Psychiatric


Psychiatric exam: Present: normal affect, normal mood





- Skin


Skin exam: Present: warm, dry, intact, normal color.  Absent: rash





ED Course


                                   Vital Signs











  05/31/22 05/31/22





  09:45 11:37


 


Temperature 98.3 F 


 


Pulse Rate 90 


 


Respiratory 16 16





Rate  


 


Blood Pressure 166/90 





[Left]  


 


O2 Sat by Pulse 99 98





Oximetry  














ED Medical Decision Making





- Medical Decision Making


67-year-old male presents to the ED with no complaints.  Patient was brought by 

ambulance home states that family member stated that the patient had bilateral 

ear pain.  Patient has a history of dementia.  She is alert and oriented to time

 place and person.  Patient denies any ear pain at present.  Denies any 

headache, pain shortness of breath.  Denies any complaint at present time.   No 

acute distress noted.  No ill appearance noted.  Physical examination patient 

has a cerumen impaction noted to the right ear.  No other pertinent finding 

noted.  Family member appeared at time of discharge states that the family 

member often called the ambulance when the patient becomes very irritated.  She 

states that patient wears hearing aids and he often will tug on ears when he 

loses his hearing aid.  Patient did not have hearing aids upon arrival to ED. 

patient daughter states he has an appointment with ENT next week.  Patient's 

daughter also states that he often gets cerumen impaction.





Rechecked the patient is resting quietly quietly and comfortable and feeling 

better. I discussed the results of diagnostic study, my clinical impression and 

the plan for further treatment with the patient. Patient  daughter agrees with 

plan and discharge at this present time. All question addressed.





I have given the patient and daughter instruction regarding a diagnosis 

,expectation ,follow-up and return precaution. I explained to the patient and 

daughter  that emergent condition may arise and to return to the ED for new 

worsen and any new persisting condition. I have explained the importance of 

following up with the primary care physician or referral physician listed below 

has instructed. The patient and daughter verbalized understanding of discharge 

instruction.








Critical care attestation.: 


If time is entered above; I have spent that time in minutes in the direct care 

of this critically ill patient, excluding procedure time.








ED Disposition


Clinical Impression: 


 Impacted cerumen of right ear





Disposition: 01 HOME / SELF CARE / HOMELESS


Is pt being admited?: No


Does the pt Need Aspirin: No


Condition: Stable


Instructions:  Earwax Buildup, Adult


Additional Instructions: 


keep appointment with ENT has previous schedule


Referrals: 


MAHENDRA KATHLEEN MD [Primary Care Provider] - 3-5 Days


Forms:  Work/School Release Form(ED)


Time of Disposition: 11:32